# Patient Record
Sex: MALE | Race: OTHER | NOT HISPANIC OR LATINO | Employment: UNEMPLOYED | URBAN - METROPOLITAN AREA
[De-identification: names, ages, dates, MRNs, and addresses within clinical notes are randomized per-mention and may not be internally consistent; named-entity substitution may affect disease eponyms.]

---

## 2023-08-25 ENCOUNTER — APPOINTMENT (OUTPATIENT)
Dept: LAB | Facility: CLINIC | Age: 33
End: 2023-08-25
Payer: COMMERCIAL

## 2023-08-25 ENCOUNTER — OFFICE VISIT (OUTPATIENT)
Dept: FAMILY MEDICINE CLINIC | Facility: CLINIC | Age: 33
End: 2023-08-25
Payer: COMMERCIAL

## 2023-08-25 VITALS
TEMPERATURE: 97 F | OXYGEN SATURATION: 97 % | DIASTOLIC BLOOD PRESSURE: 86 MMHG | HEIGHT: 70 IN | HEART RATE: 71 BPM | SYSTOLIC BLOOD PRESSURE: 130 MMHG | WEIGHT: 213 LBS | BODY MASS INDEX: 30.49 KG/M2

## 2023-08-25 DIAGNOSIS — N46.9 INFERTILITY MALE: ICD-10-CM

## 2023-08-25 DIAGNOSIS — Z13.220 SCREENING FOR LIPID DISORDERS: ICD-10-CM

## 2023-08-25 DIAGNOSIS — Z00.01 ENCOUNTER FOR WELL ADULT EXAM WITH ABNORMAL FINDINGS: Primary | ICD-10-CM

## 2023-08-25 DIAGNOSIS — Z83.3 FAMILY HISTORY OF DIABETES MELLITUS (DM): ICD-10-CM

## 2023-08-25 DIAGNOSIS — E66.09 CLASS 1 OBESITY DUE TO EXCESS CALORIES WITHOUT SERIOUS COMORBIDITY WITH BODY MASS INDEX (BMI) OF 30.0 TO 30.9 IN ADULT: ICD-10-CM

## 2023-08-25 DIAGNOSIS — R53.83 OTHER FATIGUE: ICD-10-CM

## 2023-08-25 DIAGNOSIS — R09.81 NASAL CONGESTION: ICD-10-CM

## 2023-08-25 DIAGNOSIS — Z13.29 SCREENING FOR THYROID DISORDER: ICD-10-CM

## 2023-08-25 DIAGNOSIS — Z00.01 ENCOUNTER FOR WELL ADULT EXAM WITH ABNORMAL FINDINGS: ICD-10-CM

## 2023-08-25 PROBLEM — E66.811 CLASS 1 OBESITY DUE TO EXCESS CALORIES WITHOUT SERIOUS COMORBIDITY WITH BODY MASS INDEX (BMI) OF 30.0 TO 30.9 IN ADULT: Status: ACTIVE | Noted: 2023-08-25

## 2023-08-25 LAB
25(OH)D3 SERPL-MCNC: 21 NG/ML (ref 30–100)
ALBUMIN SERPL BCP-MCNC: 4.4 G/DL (ref 3.5–5)
ALP SERPL-CCNC: 69 U/L (ref 34–104)
ALT SERPL W P-5'-P-CCNC: 20 U/L (ref 7–52)
ANION GAP SERPL CALCULATED.3IONS-SCNC: 6 MMOL/L
AST SERPL W P-5'-P-CCNC: 17 U/L (ref 13–39)
BASOPHILS # BLD AUTO: 0.04 THOUSANDS/ÂΜL (ref 0–0.1)
BASOPHILS NFR BLD AUTO: 1 % (ref 0–1)
BILIRUB SERPL-MCNC: 0.36 MG/DL (ref 0.2–1)
BUN SERPL-MCNC: 21 MG/DL (ref 5–25)
CALCIUM SERPL-MCNC: 9.3 MG/DL (ref 8.4–10.2)
CHLORIDE SERPL-SCNC: 105 MMOL/L (ref 96–108)
CHOLEST SERPL-MCNC: 182 MG/DL
CO2 SERPL-SCNC: 29 MMOL/L (ref 21–32)
CREAT SERPL-MCNC: 0.92 MG/DL (ref 0.6–1.3)
EOSINOPHIL # BLD AUTO: 0.28 THOUSAND/ÂΜL (ref 0–0.61)
EOSINOPHIL NFR BLD AUTO: 7 % (ref 0–6)
ERYTHROCYTE [DISTWIDTH] IN BLOOD BY AUTOMATED COUNT: 13 % (ref 11.6–15.1)
GFR SERPL CREATININE-BSD FRML MDRD: 110 ML/MIN/1.73SQ M
GLUCOSE P FAST SERPL-MCNC: 89 MG/DL (ref 65–99)
HBV SURFACE AG SER QL: NORMAL
HCT VFR BLD AUTO: 48.6 % (ref 36.5–49.3)
HDLC SERPL-MCNC: 52 MG/DL
HGB BLD-MCNC: 16.4 G/DL (ref 12–17)
HIV 1+2 AB+HIV1 P24 AG SERPL QL IA: NORMAL
HIV 2 AB SERPL QL IA: NORMAL
HIV1 AB SERPL QL IA: NORMAL
HIV1 P24 AG SERPL QL IA: NORMAL
IMM GRANULOCYTES # BLD AUTO: 0.01 THOUSAND/UL (ref 0–0.2)
IMM GRANULOCYTES NFR BLD AUTO: 0 % (ref 0–2)
LDLC SERPL CALC-MCNC: 119 MG/DL (ref 0–100)
LYMPHOCYTES # BLD AUTO: 2.15 THOUSANDS/ÂΜL (ref 0.6–4.47)
LYMPHOCYTES NFR BLD AUTO: 50 % (ref 14–44)
MCH RBC QN AUTO: 30 PG (ref 26.8–34.3)
MCHC RBC AUTO-ENTMCNC: 33.7 G/DL (ref 31.4–37.4)
MCV RBC AUTO: 89 FL (ref 82–98)
MONOCYTES # BLD AUTO: 0.44 THOUSAND/ÂΜL (ref 0.17–1.22)
MONOCYTES NFR BLD AUTO: 10 % (ref 4–12)
NEUTROPHILS # BLD AUTO: 1.36 THOUSANDS/ÂΜL (ref 1.85–7.62)
NEUTS SEG NFR BLD AUTO: 32 % (ref 43–75)
NRBC BLD AUTO-RTO: 0 /100 WBCS
PLATELET # BLD AUTO: 201 THOUSANDS/UL (ref 149–390)
PMV BLD AUTO: 11.8 FL (ref 8.9–12.7)
POTASSIUM SERPL-SCNC: 4.2 MMOL/L (ref 3.5–5.3)
PROT SERPL-MCNC: 7.4 G/DL (ref 6.4–8.4)
RBC # BLD AUTO: 5.46 MILLION/UL (ref 3.88–5.62)
SODIUM SERPL-SCNC: 140 MMOL/L (ref 135–147)
TRIGL SERPL-MCNC: 56 MG/DL
TSH SERPL DL<=0.05 MIU/L-ACNC: 2.28 UIU/ML (ref 0.45–4.5)
VIT B12 SERPL-MCNC: 480 PG/ML (ref 180–914)
WBC # BLD AUTO: 4.28 THOUSAND/UL (ref 4.31–10.16)

## 2023-08-25 PROCEDURE — 87389 HIV-1 AG W/HIV-1&-2 AB AG IA: CPT

## 2023-08-25 PROCEDURE — 99385 PREV VISIT NEW AGE 18-39: CPT | Performed by: FAMILY MEDICINE

## 2023-08-25 PROCEDURE — 82607 VITAMIN B-12: CPT

## 2023-08-25 PROCEDURE — 85025 COMPLETE CBC W/AUTO DIFF WBC: CPT

## 2023-08-25 PROCEDURE — 80053 COMPREHEN METABOLIC PANEL: CPT

## 2023-08-25 PROCEDURE — 87340 HEPATITIS B SURFACE AG IA: CPT

## 2023-08-25 PROCEDURE — 82306 VITAMIN D 25 HYDROXY: CPT

## 2023-08-25 PROCEDURE — 80061 LIPID PANEL: CPT

## 2023-08-25 PROCEDURE — 99214 OFFICE O/P EST MOD 30 MIN: CPT | Performed by: FAMILY MEDICINE

## 2023-08-25 PROCEDURE — 36415 COLL VENOUS BLD VENIPUNCTURE: CPT

## 2023-08-25 PROCEDURE — 84443 ASSAY THYROID STIM HORMONE: CPT

## 2023-08-25 RX ORDER — ZINC GLUCONATE 50 MG
50 TABLET ORAL DAILY
COMMUNITY

## 2023-08-25 RX ORDER — FLUTICASONE PROPIONATE 50 MCG
1 SPRAY, SUSPENSION (ML) NASAL DAILY
Qty: 16 G | Refills: 1 | Status: SHIPPED | OUTPATIENT
Start: 2023-08-25

## 2023-08-25 NOTE — PROGRESS NOTES
ADULT ANNUAL PHYSICAL  [address] [facility] PRIMARY CARE [facility]    NAME: Josiane Brice  AGE: 34 y.o. SEX: male  : 1990     DATE: 2023     Assessment and Plan:     Problem List Items Addressed This Visit        Genitourinary    Infertility male    Relevant Orders    CBC and differential (Completed)    Comprehensive metabolic panel (Completed)    HIV 1/2 AG/AB w Reflex [facility] for 2 yr old and above (Completed)    Hepatitis B surface antigen (Completed)       Other    Encounter for well adult exam with abnormal findings - Primary     Advice and education were given regarding nutrition, aerobic exercises, weight-bearing exercises, cardiovascular risk reduction, fall risk reduction, and age-appropriate supplements. The patient was counseled regarding instructions for management, risk factor reductions, prognosis, risks and benefits of treatment options, patient and family education, and importance of compliance with treatment.           Relevant Orders    CBC and differential (Completed)    Comprehensive metabolic panel (Completed)    Other fatigue     New diagnosis symptomatic no rash and no large-volume no muscle atrophy or pain recommend work-up and discussed with the patient he agree discussed sleep hygiene and keep well hydration          Relevant Orders    CBC and differential (Completed)    Comprehensive metabolic panel (Completed)    Vitamin D 25 hydroxy (Completed)    Vitamin B12 (Completed)    Class 1 obesity due to excess calories without serious comorbidity with body mass index (BMI) of 30.0 to 30.9 in adult     BMI today 30.74 discussed with patient portion control low-carb low-fat diet and increase physical activity         Relevant Orders    CBC and differential (Completed)    Comprehensive metabolic panel (Completed)    Family history of diabetes mellitus (DM)     Abnormal BMI with a history of family history positive for diabetes visit commend to screen for DM   he agrees         Relevant Orders    CBC and differential (Completed)    Comprehensive metabolic panel (Completed)    Nasal congestion     New diagnosis symptomatic recommend as needed nasal spray 2 sprays nostril once a day proper use and possible side effect discussed with patient         Relevant Medications    fluticasone (FLONASE) 50 mcg/act nasal spray    Other Relevant Orders    CBC and differential (Completed)    Comprehensive metabolic panel (Completed)   Other Visit Diagnoses     Screening for thyroid disorder        Relevant Orders    TSH, 3rd generation with Free T4 reflex (Completed)    Screening for lipid disorders        Relevant Orders    Lipid Panel with Direct LDL reflex (Completed)          Immunizations and preventive care screenings were discussed with patient today. Appropriate education was printed on patient's after visit summary. Counseling:  Alcohol/drug use: discussed moderation in alcohol intake, the recommendations for healthy alcohol use, and avoidance of illicit drug use. Dental Health: discussed importance of regular tooth brushing, flossing, and dental visits. Injury prevention: discussed safety/seat belts, safety helmets, smoke detectors, carbon dioxide detectors, and smoking near bedding or upholstery. Sexual health: discussed sexually transmitted diseases, partner selection, use of condoms, avoidance of unintended pregnancy, and contraceptive alternatives. Exercise: the importance of regular exercise/physical activity was discussed. Recommend exercise 3-5 times per week for at least 30 minutes. BMI Counseling: Body mass index is 30.74 kg/m². The BMI is above normal. Nutrition recommendations include decreasing portion sizes, decreasing fast food intake and limiting drinks that contain sugar. Exercise recommendations include exercising 3-5 times per week. No pharmacotherapy was ordered.  Rationale for BMI follow-up plan is due to patient being overweight or obese. Depression Screening and Follow-up Plan: Patient was screened for depression during today's encounter. They screened negative with a PHQ-2 score of 0. Return in about 1 year (around 8/25/2024). Chief Complaint:     Chief Complaint   Patient presents with   • New Patient Visit   • Physical Exam      History of Present Illness:     Adult Annual Physical   Patient here for a comprehensive physical exam. The patient reports problems - concern about nasal congestion facial pressure postnasal drip no fever no cough no wheezing no headache also patient concerned about fatigue/NO rash no muscle pain no nausea no vomiting no diarrhea he has not been drinking with Benadryl. 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 Diet and Physical Activity  Diet/Nutrition: no special diet. Exercise: no formal exercise. Depression Screening  PHQ-2/9 Depression Screening    Little interest or pleasure in doing things: 0 - not at all  Feeling down, depressed, or hopeless: 0 - not at all  PHQ-2 Score: 0  PHQ-2 Interpretation: Negative depression screen       General Health  Sleep: gets 4-6 hours of sleep on average. Hearing: normal - bilateral.  Vision: previous LASIK surgery. Dental: regular dental visits.  Health  History of STDs?: no.     Review of Systems:     Review of Systems   Constitutional: Positive for fatigue. Negative for chills and fever. HENT: Positive for congestion. Negative for ear pain and sore throat. Eyes: Negative for pain and visual disturbance. Respiratory: Negative for cough and shortness of breath. Cardiovascular: Negative for chest pain and palpitations. Gastrointestinal: Negative for abdominal pain, blood in stool, constipation, diarrhea and vomiting. Genitourinary: Negative for dysuria and hematuria. Musculoskeletal: Negative for arthralgias and back pain. Skin: Negative for color change and rash.    Neurological: Negative for seizures and syncope. Hematological: Does not bruise/bleed easily. Psychiatric/Behavioral: Negative for behavioral problems. All other systems reviewed and are negative. Past Medical History:     History reviewed. No pertinent past medical history. Past Surgical History:     Past Surgical History:   Procedure Laterality Date   • LASIK        Social History:     Social History     Socioeconomic History   • Marital status: /Civil Union     Spouse name: None   • Number of children: None   • Years of education: None   • Highest education level: None   Occupational History   • None   Tobacco Use   • Smoking status: Never     Passive exposure: Never   • Smokeless tobacco: Never   Substance and Sexual Activity   • Alcohol use: Never   • Drug use: Never   • Sexual activity: None   Other Topics Concern   • None   Social History Narrative   • None     Social Determinants of Health     Financial Resource Strain: Not on file   Food Insecurity: Not on file   Transportation Needs: Not on file   Physical Activity: Not on file   Stress: Not on file   Social Connections: Not on file   Intimate Partner Violence: Not on file   Housing Stability: Not on file      Family History:     Family History   Problem Relation Age of Onset   • Hypertension Mother    • Hyperlipidemia Mother    • Diabetes Mother       Current Medications:     Current Outpatient Medications   Medication Sig Dispense Refill   • fluticasone (FLONASE) 50 mcg/act nasal spray 1 spray into each nostril daily 16 g 1   • zinc gluconate 50 mg tablet Take 50 mg by mouth daily       No current facility-administered medications for this visit. Allergies:     No Known Allergies   Physical Exam:     /86 (BP Location: Left arm, Patient Position: Sitting)   Pulse 71   Temp (!) 97 °F (36.1 °C) (Tympanic)   Ht 5' 9.8" (1.773 m)   Wt 96.6 kg (213 lb)   SpO2 97%   BMI 30.74 kg/m²     Physical Exam  Vitals and nursing note reviewed. Constitutional:       General: He is not in acute distress. Appearance: He is well-developed. HENT:      Head: Normocephalic and atraumatic. Right Ear: Tympanic membrane normal. There is no impacted cerumen. Left Ear: Tympanic membrane normal. There is no impacted cerumen. Nose: Congestion present. Mouth/Throat:      Pharynx: No posterior oropharyngeal erythema. Eyes:      General:         Right eye: No discharge. Left eye: No discharge. Conjunctiva/sclera: Conjunctivae normal.   Cardiovascular:      Rate and Rhythm: Normal rate and regular rhythm. Heart sounds: No murmur heard. Pulmonary:      Effort: Pulmonary effort is normal. No respiratory distress. Breath sounds: Normal breath sounds. Abdominal:      Palpations: Abdomen is soft. Tenderness: There is no abdominal tenderness. Musculoskeletal:         General: No swelling. Cervical back: Neck supple. Skin:     General: Skin is warm and dry. Capillary Refill: Capillary refill takes less than 2 seconds. Findings: No erythema or rash. Neurological:      Mental Status: He is alert and oriented to person, place, and time.    Psychiatric:         Mood and Affect: Mood normal.          Jacques Gallegos MD   [address] details…

## 2023-08-27 NOTE — ASSESSMENT & PLAN NOTE
BMI today 30.74 discussed with patient portion control low-carb low-fat diet and increase physical activity

## 2023-08-27 NOTE — ASSESSMENT & PLAN NOTE
New diagnosis symptomatic no rash and no large-volume no muscle atrophy or pain recommend work-up and discussed with the patient he agree discussed sleep hygiene and keep well hydration

## 2023-08-27 NOTE — ASSESSMENT & PLAN NOTE
New diagnosis symptomatic recommend as needed nasal spray 2 sprays nostril once a day proper use and possible side effect discussed with patient

## 2023-08-27 NOTE — ASSESSMENT & PLAN NOTE
Abnormal BMI with a history of family history positive for diabetes visit commend to screen for DM   he agrees

## 2023-09-26 ENCOUNTER — OFFICE VISIT (OUTPATIENT)
Dept: FAMILY MEDICINE CLINIC | Facility: CLINIC | Age: 33
End: 2023-09-26
Payer: COMMERCIAL

## 2023-09-26 VITALS
HEIGHT: 70 IN | SYSTOLIC BLOOD PRESSURE: 130 MMHG | OXYGEN SATURATION: 96 % | BODY MASS INDEX: 29.35 KG/M2 | DIASTOLIC BLOOD PRESSURE: 72 MMHG | TEMPERATURE: 97.6 F | HEART RATE: 73 BPM | WEIGHT: 205 LBS

## 2023-09-26 DIAGNOSIS — D72.818 OTHER DECREASED WHITE BLOOD CELL (WBC) COUNT: ICD-10-CM

## 2023-09-26 DIAGNOSIS — E55.9 VITAMIN D DEFICIENCY: Primary | ICD-10-CM

## 2023-09-26 PROBLEM — D72.819 LEUCOPENIA: Status: ACTIVE | Noted: 2023-09-26

## 2023-09-26 PROBLEM — E56.9 VITAMIN DEFICIENCY: Status: ACTIVE | Noted: 2023-09-26

## 2023-09-26 PROCEDURE — 99214 OFFICE O/P EST MOD 30 MIN: CPT | Performed by: FAMILY MEDICINE

## 2023-09-26 RX ORDER — ERGOCALCIFEROL 1.25 MG/1
50000 CAPSULE ORAL WEEKLY
Qty: 4 CAPSULE | Refills: 2 | Status: SHIPPED | OUTPATIENT
Start: 2023-09-26

## 2023-09-26 NOTE — ASSESSMENT & PLAN NOTE
New diagnosis finding on recent blood work recommended patient to start taking vitamin D 50,000 IU once a week for 12-week proper use and possible side effects constipation

## 2023-09-26 NOTE — ASSESSMENT & PLAN NOTE
New finding on recent blood work low white blood cell patient asymptomatic no fever no weight loss recommend to be a check in 4-week if persistent to be low we will refer the patient to see hematology

## 2023-09-26 NOTE — PROGRESS NOTES
Name: Nik Sellers      : 1990      MRN: [id number]  Encounter Provider: Pina Ferrara MD  Encounter Date: 2023   Encounter department: 1 [facility] [address]     1. Vitamin D deficiency  Assessment & Plan:  New diagnosis finding on recent blood work recommended patient to start taking vitamin D 50,000 IU once a week for 12-week proper use and possible side effects constipation    Orders:  -     ergocalciferol (VITAMIN D2) 50,000 units; Take 1 capsule (50,000 Units total) by mouth once a week  -     Vitamin D 25 hydroxy; Future    2. Other decreased white blood cell (WBC) count  Assessment & Plan:  New finding on recent blood work low white blood cell patient asymptomatic no fever no weight loss recommend to be a check in 4-week if persistent to be low we will refer the patient to see hematology    Orders:  -     CBC and differential; Future           Subjective      Patient here follow-up with a chronic condition recent blood work discussed with the patient no new concern    Review of Systems   Constitutional: Negative for chills and fever. HENT: Negative for ear pain and sore throat. Eyes: Negative for pain and visual disturbance. Respiratory: Negative for cough and shortness of breath. Cardiovascular: Negative for chest pain and palpitations. Gastrointestinal: Negative for abdominal pain, blood in stool, constipation, diarrhea and vomiting. Genitourinary: Negative for dysuria and hematuria. Musculoskeletal: Negative for arthralgias and back pain. Skin: Negative for color change and rash. Neurological: Negative for seizures and syncope. All other systems reviewed and are negative.       Current Outpatient Medications on File Prior to Visit   Medication Sig   • fluticasone (FLONASE) 50 mcg/act nasal spray 1 spray into each nostril daily   • zinc gluconate 50 mg tablet Take 50 mg by mouth daily       Objective     /72 (BP Location: Left arm, Patient Position: Sitting)   Pulse 73   Temp 97.6 °F (36.4 °C) (Tympanic)   Ht 5' 9.8" (1.773 m)   Wt 93 kg (205 lb)   SpO2 96%   BMI 29.58 kg/m²     Physical Exam  Vitals and nursing note reviewed. Constitutional:       General: He is not in acute distress. Appearance: He is well-developed. He is not diaphoretic. HENT:      Head: Normocephalic. Right Ear: External ear normal.      Left Ear: External ear normal.      Mouth/Throat:      Pharynx: No posterior oropharyngeal erythema. Eyes:      General:         Right eye: No discharge. Left eye: No discharge. Conjunctiva/sclera: Conjunctivae normal.   Neck:      Vascular: No JVD. Cardiovascular:      Rate and Rhythm: Normal rate and regular rhythm. Heart sounds: Normal heart sounds. No murmur heard. No gallop. Pulmonary:      Effort: Pulmonary effort is normal. No respiratory distress. Breath sounds: Normal breath sounds. No stridor. No wheezing or rales. Chest:      Chest wall: No tenderness. Abdominal:      General: There is no distension. Palpations: Abdomen is soft. There is no mass. Tenderness: There is no abdominal tenderness. There is no rebound. Musculoskeletal:         General: No tenderness. Cervical back: Normal range of motion and neck supple. Lymphadenopathy:      Cervical: No cervical adenopathy. Skin:     General: Skin is warm. Findings: No erythema or rash. Neurological:      Mental Status: He is alert and oriented to person, place, and time.        Grisel Pablo MD

## 2023-11-22 ENCOUNTER — OFFICE VISIT (OUTPATIENT)
Dept: FAMILY MEDICINE CLINIC | Facility: CLINIC | Age: 33
End: 2023-11-22
Payer: COMMERCIAL

## 2023-11-22 VITALS
DIASTOLIC BLOOD PRESSURE: 80 MMHG | OXYGEN SATURATION: 98 % | HEIGHT: 70 IN | HEART RATE: 67 BPM | BODY MASS INDEX: 27.49 KG/M2 | TEMPERATURE: 97 F | SYSTOLIC BLOOD PRESSURE: 110 MMHG | WEIGHT: 192 LBS

## 2023-11-22 DIAGNOSIS — E55.9 VITAMIN D DEFICIENCY: ICD-10-CM

## 2023-11-22 DIAGNOSIS — M54.41 ACUTE RIGHT-SIDED LOW BACK PAIN WITH RIGHT-SIDED SCIATICA: Primary | ICD-10-CM

## 2023-11-22 PROCEDURE — 99214 OFFICE O/P EST MOD 30 MIN: CPT | Performed by: FAMILY MEDICINE

## 2023-11-22 RX ORDER — METHYLPREDNISOLONE 4 MG/1
TABLET ORAL
Qty: 21 EACH | Refills: 0 | Status: SHIPPED | OUTPATIENT
Start: 2023-11-22

## 2023-11-22 RX ORDER — ERGOCALCIFEROL 1.25 MG/1
50000 CAPSULE ORAL WEEKLY
Qty: 4 CAPSULE | Refills: 2 | Status: SHIPPED | OUTPATIENT
Start: 2023-11-22

## 2023-11-22 NOTE — PROGRESS NOTES
Name: Omar Jaimes      : 1990      MRN: [id number]  Encounter Provider: Marthena Denver, MD  Encounter Date: 2023   Encounter department: 1 [facility] [address]     1. Acute right-sided low back pain with right-sided sciatica  Assessment & Plan:  Acute symptomatic no fall or trauma recommend idiopathically dissected proper use and possible side effects reviewed if no improvement call x-ray referral to physical therapy and further x-ray    Orders:  -     methylPREDNISolone 4 MG tablet therapy pack; Use as directed on package    2. Vitamin D deficiency  Assessment & Plan:  Chronic recommend the patient to take the vitamin D supplement for 12-week refill has been given    Orders:  -     ergocalciferol (VITAMIN D2) 50,000 units; Take 1 capsule (50,000 Units total) by mouth once a week        Depression Screening and Follow-up Plan: Patient was screened for depression during today's encounter. They screened negative with a PHQ-2 score of 0. Subjective      Patient concerned about low back pain radiating to the back. Worse when he sits for a long time and no fall no trauma no numbness no muscle weakness numbness control of the urine or stool patient history of vitamin D deficiency patient thought he had the vitamin D supplement 1000 units just for 4-week      Review of Systems   Constitutional:  Negative for chills and fever. HENT:  Negative for ear pain and sore throat. Eyes:  Negative for pain and visual disturbance. Respiratory:  Negative for cough and shortness of breath. Cardiovascular:  Negative for chest pain and palpitations. Gastrointestinal:  Negative for abdominal pain, constipation, diarrhea and vomiting. Genitourinary:  Negative for dysuria and hematuria. Musculoskeletal:  Positive for back pain. Skin:  Negative for color change and rash. Neurological:  Negative for seizures and syncope.    All other systems reviewed and are negative. Current Outpatient Medications on File Prior to Visit   Medication Sig   • zinc gluconate 50 mg tablet Take 50 mg by mouth daily   • [DISCONTINUED] ergocalciferol (VITAMIN D2) 50,000 units Take 1 capsule (50,000 Units total) by mouth once a week   • [DISCONTINUED] fluticasone (FLONASE) 50 mcg/act nasal spray 1 spray into each nostril daily       Objective     /80 (BP Location: Left arm, Patient Position: Sitting)   Pulse 67   Temp (!) 97 °F (36.1 °C) (Tympanic)   Ht 5' 9.5" (1.765 m)   Wt 87.1 kg (192 lb)   SpO2 98%   BMI 27.95 kg/m²     Physical Exam  Vitals and nursing note reviewed. Constitutional:       General: He is not in acute distress. Appearance: Normal appearance. He is well-developed. He is not diaphoretic. HENT:      Head: Normocephalic. Right Ear: Tympanic membrane, ear canal and external ear normal.      Left Ear: Tympanic membrane, ear canal and external ear normal.      Nose: Nose normal. No congestion or rhinorrhea. Mouth/Throat:      Mouth: Mucous membranes are moist.      Pharynx: Oropharynx is clear. No oropharyngeal exudate or posterior oropharyngeal erythema. Eyes:      General:         Right eye: No discharge. Left eye: No discharge. Conjunctiva/sclera: Conjunctivae normal.   Neck:      Vascular: No JVD. Cardiovascular:      Rate and Rhythm: Normal rate and regular rhythm. Heart sounds: Normal heart sounds. No murmur heard. No gallop. Pulmonary:      Effort: Pulmonary effort is normal. No respiratory distress. Breath sounds: Normal breath sounds. No stridor. No wheezing or rales. Chest:      Chest wall: No tenderness. Abdominal:      General: There is no distension. Palpations: Abdomen is soft. There is no mass. Tenderness: There is no abdominal tenderness. There is no rebound. Musculoskeletal:      Cervical back: Normal range of motion and neck supple. Lumbar back: Tenderness present. Positive right straight leg raise test. Negative left straight leg raise test.        Back:    Lymphadenopathy:      Cervical: No cervical adenopathy. Skin:     General: Skin is warm. Findings: No erythema or rash. Neurological:      Mental Status: He is alert and oriented to person, place, and time. Sensory: No sensory deficit.       Gait: Gait normal.   Psychiatric:         Mood and Affect: Mood normal.         Behavior: Behavior normal.       Emily Anne MD Never smoker

## 2023-11-22 NOTE — ASSESSMENT & PLAN NOTE
Acute symptomatic no fall or trauma recommend idiopathically dissected proper use and possible side effects reviewed if no improvement call x-ray referral to physical therapy and further x-ray

## 2024-02-28 ENCOUNTER — OFFICE VISIT (OUTPATIENT)
Dept: FAMILY MEDICINE CLINIC | Facility: CLINIC | Age: 34
End: 2024-02-28
Payer: COMMERCIAL

## 2024-02-28 VITALS
DIASTOLIC BLOOD PRESSURE: 82 MMHG | SYSTOLIC BLOOD PRESSURE: 118 MMHG | HEART RATE: 73 BPM | OXYGEN SATURATION: 96 % | HEIGHT: 70 IN | TEMPERATURE: 98.1 F | BODY MASS INDEX: 29.12 KG/M2 | WEIGHT: 203.4 LBS

## 2024-02-28 DIAGNOSIS — G89.29 ACUTE EXACERBATION OF CHRONIC LOW BACK PAIN: Primary | ICD-10-CM

## 2024-02-28 DIAGNOSIS — M54.50 ACUTE EXACERBATION OF CHRONIC LOW BACK PAIN: Primary | ICD-10-CM

## 2024-02-28 DIAGNOSIS — M54.41 ACUTE RIGHT-SIDED LOW BACK PAIN WITH RIGHT-SIDED SCIATICA: ICD-10-CM

## 2024-02-28 PROCEDURE — 99214 OFFICE O/P EST MOD 30 MIN: CPT | Performed by: FAMILY MEDICINE

## 2024-02-28 RX ORDER — METHYLPREDNISOLONE 4 MG/1
TABLET ORAL
Qty: 21 EACH | Refills: 0 | Status: SHIPPED | OUTPATIENT
Start: 2024-02-28

## 2024-03-01 NOTE — ASSESSMENT & PLAN NOTE
Acute on chronic low back pain no recent trauma recommend x-ray of the lumbar spine Medrol pack proper use reviewed possible side effect

## 2024-03-01 NOTE — PROGRESS NOTES
Name: Yasmine Wahl      : 1990      MRN: [id number]  Encounter Provider: Romy Singh MD  Encounter Date: 2024   Encounter department: [facility]    Assessment & Plan     1. Acute exacerbation of chronic low back pain  Assessment & Plan:  Acute on chronic low back pain no recent trauma recommend x-ray of the lumbar spine Medrol pack proper use reviewed possible side effect    Orders:  -     XR spine lumbar minimum 4 views non injury; Future; Expected date: 2024    2. Acute right-sided low back pain with right-sided sciatica  -     methylPREDNISolone 4 MG tablet therapy pack; Use as directed on package  -     XR spine lumbar minimum 4 views non injury; Future; Expected date: 2024           Subjective      Patient now known to have history chronic low back pain called today concerned about pain acting up on him No trauma localized in the lower back radiated to the lower extremity no numbness or muscle weakness no lose control of the urine or stool and no recent fall or trauma      Review of Systems   Constitutional:  Negative for chills and fever.   HENT:  Negative for ear pain and sore throat.    Eyes:  Negative for pain and visual disturbance.   Respiratory:  Negative for cough and shortness of breath.    Cardiovascular:  Negative for chest pain and palpitations.   Gastrointestinal:  Negative for abdominal pain, constipation, diarrhea and vomiting.   Genitourinary:  Negative for dysuria and hematuria.   Musculoskeletal:  Positive for back pain. Negative for arthralgias.   Skin:  Negative for color change and rash.   Neurological:  Negative for seizures and syncope.   All other systems reviewed and are negative.      Current Outpatient Medications on File Prior to Visit   Medication Sig   • ergocalciferol (VITAMIN D2) 50,000 units Take 1 capsule (50,000 Units total) by mouth once a week (Patient not taking: Reported on 2024)   • zinc gluconate 50  "mg tablet Take 50 mg by mouth daily (Patient not taking: Reported on 2/28/2024)       Objective     /82 (BP Location: Left arm, Patient Position: Sitting)   Pulse 73   Temp 98.1 °F (36.7 °C) (Tympanic)   Ht 5' 9.5\" (1.765 m)   Wt 92.3 kg (203 lb 6.4 oz)   SpO2 96%   BMI 29.61 kg/m²     Physical Exam  Vitals and nursing note reviewed.   Constitutional:       General: He is not in acute distress.     Appearance: He is well-developed. He is not diaphoretic.   HENT:      Head: Normocephalic.      Right Ear: Tympanic membrane and external ear normal.      Left Ear: Tympanic membrane and external ear normal.      Nose: No rhinorrhea.      Mouth/Throat:      Pharynx: No posterior oropharyngeal erythema.   Eyes:      General:         Right eye: No discharge.         Left eye: No discharge.      Conjunctiva/sclera: Conjunctivae normal.   Neck:      Vascular: No JVD.   Cardiovascular:      Rate and Rhythm: Normal rate and regular rhythm.      Heart sounds: Normal heart sounds. No murmur heard.     No gallop.   Pulmonary:      Effort: Pulmonary effort is normal. No respiratory distress.      Breath sounds: Normal breath sounds. No stridor. No wheezing or rales.   Chest:      Chest wall: No tenderness.   Abdominal:      General: There is no distension.      Palpations: Abdomen is soft. There is no mass.      Tenderness: There is no abdominal tenderness. There is no rebound.   Musculoskeletal:      Cervical back: Normal range of motion and neck supple.      Lumbar back: Tenderness present. No signs of trauma or bony tenderness. Positive right straight leg raise test and positive left straight leg raise test.   Lymphadenopathy:      Cervical: No cervical adenopathy.   Skin:     General: Skin is warm.      Findings: No erythema or rash.   Neurological:      Mental Status: He is alert and oriented to person, place, and time.       Romy Singh MD    "

## 2024-05-17 ENCOUNTER — HOSPITAL ENCOUNTER (OUTPATIENT)
Dept: RADIOLOGY | Facility: HOSPITAL | Age: 34
Discharge: HOME | End: 2024-05-17
Payer: COMMERCIAL

## 2024-05-17 DIAGNOSIS — M54.50 ACUTE EXACERBATION OF CHRONIC LOW BACK PAIN: ICD-10-CM

## 2024-05-17 DIAGNOSIS — M54.41 ACUTE RIGHT-SIDED LOW BACK PAIN WITH RIGHT-SIDED SCIATICA: ICD-10-CM

## 2024-05-17 DIAGNOSIS — G89.29 ACUTE EXACERBATION OF CHRONIC LOW BACK PAIN: ICD-10-CM

## 2024-05-17 PROCEDURE — 72110 X-RAY EXAM L-2 SPINE 4/>VWS: CPT

## 2024-05-20 ENCOUNTER — TELEMEDICINE (OUTPATIENT)
Dept: FAMILY MEDICINE CLINIC | Facility: CLINIC | Age: 34
End: 2024-05-20
Payer: COMMERCIAL

## 2024-05-20 VITALS — BODY MASS INDEX: 29.06 KG/M2 | WEIGHT: 203 LBS | HEIGHT: 70 IN

## 2024-05-20 DIAGNOSIS — Z13.0 SCREENING FOR BLOOD DISEASE: ICD-10-CM

## 2024-05-20 DIAGNOSIS — Z13.29 SCREENING FOR THYROID DISORDER: ICD-10-CM

## 2024-05-20 DIAGNOSIS — D72.818 OTHER DECREASED WHITE BLOOD CELL (WBC) COUNT: ICD-10-CM

## 2024-05-20 DIAGNOSIS — M54.50 ACUTE EXACERBATION OF CHRONIC LOW BACK PAIN: Primary | ICD-10-CM

## 2024-05-20 DIAGNOSIS — E66.09 CLASS 1 OBESITY DUE TO EXCESS CALORIES WITHOUT SERIOUS COMORBIDITY WITH BODY MASS INDEX (BMI) OF 30.0 TO 30.9 IN ADULT: ICD-10-CM

## 2024-05-20 DIAGNOSIS — E55.9 VITAMIN D DEFICIENCY: ICD-10-CM

## 2024-05-20 DIAGNOSIS — G89.29 ACUTE EXACERBATION OF CHRONIC LOW BACK PAIN: Primary | ICD-10-CM

## 2024-05-20 DIAGNOSIS — Z13.228 SCREENING FOR METABOLIC DISORDER: ICD-10-CM

## 2024-05-20 DIAGNOSIS — Z13.220 SCREENING FOR LIPID DISORDERS: ICD-10-CM

## 2024-05-20 PROCEDURE — 99214 OFFICE O/P EST MOD 30 MIN: CPT | Performed by: FAMILY MEDICINE

## 2024-05-20 RX ORDER — NAPROXEN 250 MG/1
250 TABLET ORAL 2 TIMES DAILY WITH MEALS
Qty: 60 TABLET | Refills: 0 | Status: SHIPPED | OUTPATIENT
Start: 2024-05-20

## 2024-05-20 NOTE — ASSESSMENT & PLAN NOTE
Chronic patient currently not on any vitamin D supplements recommend to check vitamin D levels will follow-up with the result accordingly

## 2024-05-20 NOTE — ASSESSMENT & PLAN NOTE
Acute symptomatic x-ray no abnormal finding recommend to start Naprosyn 250 mg twice a day proper use and possible side effect review recommend physical therapy and MRI of the lumbar spin  Also I recommend to do blood work check C-reactive protein and BREANNE HLA-B27

## 2024-05-20 NOTE — PROGRESS NOTES
Virtual Regular Visit    Verification of patient location:    Patient is located at Other in the following state in which I hold an active license [place]      Assessment/Plan:    Problem List Items Addressed This Visit       Class 1 obesity due to excess calories without serious comorbidity with body mass index (BMI) of 30.0 to 30.9 in adult    Relevant Orders    Comprehensive metabolic panel    Vitamin D deficiency     Chronic patient currently not on any vitamin D supplements recommend to check vitamin D levels will follow-up with the result accordingly         Relevant Orders    CBC and differential    Comprehensive metabolic panel    BREANNE w/Reflex    RF Screen w/ Reflex to Titer    Vitamin D 25 hydroxy    Cyclic citrul peptide antibody, IgG    HLA-B27 antigen    MRI lumbar spine wo contrast    Leucopenia    Relevant Medications    naproxen (NAPROSYN) 250 mg tablet    Other Relevant Orders    CBC and differential    Comprehensive metabolic panel    Acute exacerbation of chronic low back pain - Primary     Acute symptomatic x-ray no abnormal finding recommend to start Naprosyn 250 mg twice a day proper use and possible side effect review recommend physical therapy and MRI of the lumbar spin  Also I recommend to do blood work check C-reactive protein and BREANNE HLA-B27         Relevant Medications    naproxen (NAPROSYN) 250 mg tablet    Other Relevant Orders    CBC and differential    Comprehensive metabolic panel    BREANNE w/Reflex    RF Screen w/ Reflex to Titer    Vitamin D 25 hydroxy    Cyclic citrul peptide antibody, IgG    HLA-B27 antigen    MRI lumbar spine wo contrast    Ambulatory Referral to Physical Therapy     Other Visit Diagnoses       Screening for thyroid disorder        Relevant Orders    TSH, 3rd generation with Free T4 reflex    Screening for lipid disorders        Relevant Orders    Lipid Panel with Direct LDL reflex    Screening for metabolic disorder        Relevant Orders    Comprehensive metabolic  panel    Screening for blood disease        Relevant Orders    CBC and differential                 Reason for visit is   Chief Complaint   Patient presents with    Follow-up    Virtual Regular Visit          Encounter provider Romy Singh MD      Recent Visits  No visits were found meeting these conditions.  Showing recent visits within past 7 days and meeting all other requirements  Today's Visits  Date Type Provider Dept   05/20/24 Telemedicine Romy Singh MD [facility] [place]   Showing today's visits and meeting all other requirements  Future Appointments  No visits were found meeting these conditions.  Showing future appointments within next 150 days and meeting all other requirements       The patient was identified by name and date of birth. Yasmine Wahl was informed that this is a telemedicine visit and that the visit is being conducted through the Epic Embedded platform. He agrees to proceed..  My office door was closed. No one else was in the room.  He acknowledged consent and understanding of privacy and security of the video platform. The patient has agreed to participate and understands they can discontinue the visit at any time.    Patient is aware this is a billable service.     Subjective  Yasmine Wahl is a 33 y.o. male  .      Patient virtual visit follow-up with a chronic condition patient who known to have history of chronic back pain his back pain acting on pain localized in the lumbar spine radiate to the lower extremity he took a Medrol pack and no improvement at all no recent fall or trauma he had x-ray of the lumbar spine results reviewed no abnormal finding patient history of vitamin D deficiency finish the course of 12-week currently not on any vitamin supplement         History reviewed. No pertinent past medical history.    Past Surgical History:   Procedure Laterality Date    LASIK         Current Outpatient Medications   Medication Sig Dispense Refill    naproxen  "(NAPROSYN) 250 mg tablet Take 1 tablet (250 mg total) by mouth 2 (two) times a day with meals 60 tablet 0    ergocalciferol (VITAMIN D2) 50,000 units Take 1 capsule (50,000 Units total) by mouth once a week (Patient not taking: Reported on 2/28/2024) 4 capsule 2     No current facility-administered medications for this visit.        No Known Allergies    Review of Systems   Constitutional:  Negative for chills and fever.   HENT:  Negative for ear pain and sore throat.    Eyes:  Negative for pain and visual disturbance.   Respiratory:  Negative for cough and shortness of breath.    Cardiovascular:  Negative for chest pain and palpitations.   Gastrointestinal:  Negative for abdominal pain, constipation, diarrhea and vomiting.   Genitourinary:  Negative for dysuria and hematuria.   Musculoskeletal:  Positive for back pain. Negative for arthralgias.   Skin:  Negative for color change and rash.   Neurological:  Negative for seizures and syncope.   All other systems reviewed and are negative.      Video Exam    Vitals:    05/20/24 1256   Weight: 92.1 kg (203 lb)   Height: 5' 9.5\" (1.765 m)       Physical Exam  Constitutional:       Appearance: Normal appearance.   HENT:      Head: Normocephalic and atraumatic.      Nose: No congestion or rhinorrhea.      Mouth/Throat:      Pharynx: No oropharyngeal exudate or posterior oropharyngeal erythema.   Eyes:      General:         Right eye: No discharge.         Left eye: No discharge.   Pulmonary:      Effort: No respiratory distress.   Abdominal:      General: There is no distension.   Skin:     Findings: No rash.   Neurological:      Mental Status: He is oriented to person, place, and time.          Visit Time  Total Visit Duration: 15        "

## 2024-05-31 ENCOUNTER — HOSPITAL ENCOUNTER (OUTPATIENT)
Dept: MRI IMAGING | Facility: HOSPITAL | Age: 34
Discharge: HOME | End: 2024-05-31
Payer: COMMERCIAL

## 2024-05-31 DIAGNOSIS — M54.50 ACUTE EXACERBATION OF CHRONIC LOW BACK PAIN: ICD-10-CM

## 2024-05-31 DIAGNOSIS — E55.9 VITAMIN D DEFICIENCY: ICD-10-CM

## 2024-05-31 DIAGNOSIS — G89.29 ACUTE EXACERBATION OF CHRONIC LOW BACK PAIN: ICD-10-CM

## 2024-05-31 PROCEDURE — 72148 MRI LUMBAR SPINE W/O DYE: CPT

## 2024-06-16 DIAGNOSIS — G89.29 ACUTE EXACERBATION OF CHRONIC LOW BACK PAIN: ICD-10-CM

## 2024-06-16 DIAGNOSIS — M54.50 ACUTE EXACERBATION OF CHRONIC LOW BACK PAIN: ICD-10-CM

## 2024-06-16 RX ORDER — NAPROXEN 250 MG/1
TABLET ORAL
Qty: 60 TABLET | Refills: 5 | Status: SHIPPED | OUTPATIENT
Start: 2024-06-16 | End: 2024-06-28

## 2024-06-17 ENCOUNTER — TELEPHONE (OUTPATIENT)
Dept: FAMILY MEDICINE CLINIC | Facility: CLINIC | Age: 34
End: 2024-06-17

## 2024-06-17 DIAGNOSIS — M47.9 SPONDYLOSIS: Primary | ICD-10-CM

## 2024-06-17 DIAGNOSIS — M47.816 SPONDYLOSIS OF LUMBAR SPINE: ICD-10-CM

## 2024-06-17 NOTE — TELEPHONE ENCOUNTER
Called and spoke with patient regarding MRI results. Advised patient of DX and referral that was placed for patient. Provided patient with phone number to schedule appointment.     Asked patient if he would like to keep or cancel upcoming appointment scheduled for 6/28/24 to discuss MRI results. Patient advised that he would like to keep this appointment as there are things that he would like to discuss with provider.

## 2024-06-17 NOTE — TELEPHONE ENCOUNTER
Left message for patient to return call to the office regarding results.     Referral for pain management ordered for patient.

## 2024-06-17 NOTE — TELEPHONE ENCOUNTER
Spoke to patient sent my chart message to contact insurance company to find participating provider

## 2024-06-17 NOTE — TELEPHONE ENCOUNTER
Patient called, stating he wanted to schedule an appointment to go over his MRI results, which I did.  After ending call and opening chart to put a message in, I see the below encounter.  He did not mention that he received a message or was returning a call.  Please return call to patient.

## 2024-06-17 NOTE — TELEPHONE ENCOUNTER
----- Message from Romy Singh MD sent at 6/16/2024  6:36 PM EDT -----  Spondylosis of lumbar spine refer patient to see pain management

## 2024-06-17 NOTE — TELEPHONE ENCOUNTER
Pt called back stating that he called [facility] and he is unable to schedule with them because his insurance is not accepted. Does the doctor have any other recommendations?. Pt stated that she was able to check eligibility with his insurance once b4

## 2024-06-26 ENCOUNTER — CONSULT (OUTPATIENT)
Dept: PAIN MEDICINE | Facility: CLINIC | Age: 34
End: 2024-06-26
Payer: COMMERCIAL

## 2024-06-26 VITALS — BODY MASS INDEX: 29.06 KG/M2 | HEIGHT: 70 IN | WEIGHT: 203 LBS

## 2024-06-26 DIAGNOSIS — M47.816 SPONDYLOSIS OF LUMBAR SPINE: ICD-10-CM

## 2024-06-26 DIAGNOSIS — M54.16 LUMBAR RADICULITIS: Primary | ICD-10-CM

## 2024-06-26 PROCEDURE — 99204 OFFICE O/P NEW MOD 45 MIN: CPT | Performed by: ANESTHESIOLOGY

## 2024-06-26 RX ORDER — GABAPENTIN 300 MG/1
300 CAPSULE ORAL 2 TIMES DAILY
Qty: 60 CAPSULE | Refills: 0 | Status: SHIPPED | OUTPATIENT
Start: 2024-06-26

## 2024-06-26 NOTE — PROGRESS NOTES
Assessment  1. Lumbar radiculitis    2. Spondylosis of lumbar spine      Patient presenting with ongoing right sided low back and radiating buttock pain for 4+ months.  Pain is consistent with lumbar radicular pain accompanied by pain 7/10 on the pain scale with inability to participate in IADLs for >6 weeks. Patient has tried naproxen, oral steroids with limited benefit.    Denies any bowel or bladder incontinence, saddle anesthesia.    In regards to the patient's pathology, we discussed the various treatment options including physical therapy, chiropractic treatment, medication management, activity modifications, interventional spine procedures.      Independently reviewed and interpreted lumbar MRI - this showed no evidence of disc herniation. There is mild spondylosis with moderate left foraminal narrowing at L5-S1, while patient's symptoms are right sided.    Plan    Recommended to initiate conservative therapy (patient has tried medication therapies since initial onset but has not trialed PT yet).    Will start course of gabapentin 300mg BID as tolerated.  I advised the patient that it could cause lethargy and mental cloudiness. I advised the patient to call our office if they experience any side effects or issues with the medication changes. The patient verbalized an understanding.    Will follow up in 6 weeks, if no improvement will discuss interventional options.    Reviewed external notes from PCP in regards to recent and prior relevant medical histories, treatment recommendations, medication and/or interventional treatment responses.    Reviewed renal function, CBC prior to recommending, initiating, continuing and/or adjusting medications.    Reviewed hemoglobin A1c, renal function, CBC and/or PT/INR prior to discussing/offering interventional modalities.    [place] Prescription Drug Monitoring Program report was reviewed and was appropriate     My impressions and treatment recommendations were  discussed in detail with the patient who verbalized understanding and had no further questions.  Discharge instructions were provided. I personally saw and examined the patient and I agree with the above discussed plan of care.    Orders Placed This Encounter   Procedures    Ambulatory referral to Physical Therapy     Standing Status:   Future     Standing Expiration Date:   6/26/2025     Referral Priority:   Routine     Referral Type:   Physical Therapy     Referral Reason:   Specialty Services Required     Requested Specialty:   Physical Therapy     Number of Visits Requested:   1     Expiration Date:   6/26/2025     New Medications Ordered This Visit   Medications    gabapentin (NEURONTIN) 300 mg capsule     Sig: Take 1 capsule (300 mg total) by mouth 2 (two) times a day     Dispense:  60 capsule     Refill:  0       History of Present Illness    Yasmine Wahl is a 33 y.o. male presenting for consultation (referred by Dr. Singh) at [facility] for exam and evaluation of ongoing R lower back and radiating buttock pain for the past 4 months. Pain started without any precipitating injury or trauma. Over the past month, the intensity of pain has been Moderate to severe. Pain is currently 7/10. Pain does interfere with age appropriate activities of daily living. Pain is intermittent, with no typical pattern throughout the day. Pain is described as shooting, sharp. Patient denies weakness in the lower extremities. Assistance device used: None.    Pain is increased with standing, sitting.   Pain is decreased with lying down.    Treatments tried:   PT: no  Chiropractic therapy: no  Injections: no   Previous spine surgery: No    Anticoagulation: no    Medications tried:   naproxen    I have personally reviewed and/or updated the patient's past medical history, past surgical history, family history, social history, current medications, allergies, and vital signs today.     Review of Systems    Constitutional:  Negative for chills and fever.   HENT:  Negative for ear pain and sore throat.    Eyes:  Negative for pain and visual disturbance.   Respiratory:  Negative for cough and shortness of breath.    Cardiovascular:  Negative for chest pain and palpitations.   Gastrointestinal:  Negative for abdominal pain and vomiting.   Genitourinary:  Negative for dysuria and hematuria.   Musculoskeletal:  Positive for back pain, gait problem and myalgias. Negative for arthralgias.   Skin:  Negative for color change and rash.   Neurological:  Negative for seizures and syncope.   All other systems reviewed and are negative.      Patient Active Problem List   Diagnosis    Encounter for well adult exam with abnormal findings    Other fatigue    Class 1 obesity due to excess calories without serious comorbidity with body mass index (BMI) of 30.0 to 30.9 in adult    Family history of diabetes mellitus (DM)    Nasal congestion    Infertility male    Vitamin D deficiency    Leucopenia    Acute exacerbation of chronic low back pain       History reviewed. No pertinent past medical history.    Past Surgical History:   Procedure Laterality Date    LASIK         Family History   Problem Relation Age of Onset    Hypertension Mother     Hyperlipidemia Mother     Diabetes Mother        Social History     Occupational History    Not on file   Tobacco Use    Smoking status: Never     Passive exposure: Never    Smokeless tobacco: Never   Vaping Use    Vaping status: Never Used   Substance and Sexual Activity    Alcohol use: Never    Drug use: Never    Sexual activity: Not on file       Current Outpatient Medications on File Prior to Visit   Medication Sig    ergocalciferol (VITAMIN D2) 50,000 units Take 1 capsule (50,000 Units total) by mouth once a week (Patient not taking: Reported on 2/28/2024)    naproxen (NAPROSYN) 250 mg tablet TAKE 1 TABLET BY MOUTH 2 TIMES A DAY WITH MEALS. (Patient not taking: Reported on 6/26/2024)     No  "current facility-administered medications on file prior to visit.       No Known Allergies    Physical Exam    Ht 5' 9.5\" (1.765 m)   Wt 92.1 kg (203 lb)   BMI 29.55 kg/m²     Constitutional: normal, well developed, well nourished, alert, in no distress and non-toxic and no overt pain behavior.  Eyes: anicteric  HEENT: grossly intact  Neck: supple, symmetric, trachea midline and no masses   Pulmonary:even and unlabored  Cardiovascular:No edema or pitting edema present  Skin:Normal without rashes or lesions and well hydrated  Psychiatric:Mood and affect appropriate  Neurologic: Motor function is grossly intact with no focal neurologic deficits   Musculoskeletal: No significant tenderness in the lumbar paraspinals or overlying the SIJ    Imaging  MRI lumbar spine:  FINDINGS:     VERTEBRAL SEGMENT AND DISC SPACES:  There are 5 caudal ribless segmented lumbar type vertebra; the last of which designated L5 for the purposes of vertebral segment numbering on this examination.     T11-T12 through L2-L3: No significant abnormality evident.     L3-L4: Evidence of mild bilateral neural foraminal narrowing due to hypertrophic facet arthropathy. Mild disc degeneration with minimal disc bulge.     L4-L5: Evidence of mild bilateral neural foraminal narrowing due to disc bulge and mild hypertrophic facet arthropathy. Mild disc degeneration.     L5-S1: Evidence of neural foraminal narrowing of a moderate degree on the left and mild degree on the right due to hypertrophic facet arthropathy and disc bulge. Mild disc degeneration.     Included portions of the sacrum: No significant abnormality evident.     SPINAL CORD: No spinal cord abnormality evident.     EXTRASPINAL STRUCTURES: No significant abnormality evident.     MARROW SIGNAL: No significant abnormality evident.     IMPRESSION:  Mild spondylosis associated with some neural foraminal narrowing detailed above and most notable at L5-S1. There is no neural encroachment " evident.

## 2024-06-28 ENCOUNTER — OFFICE VISIT (OUTPATIENT)
Dept: FAMILY MEDICINE CLINIC | Facility: CLINIC | Age: 34
End: 2024-06-28
Payer: COMMERCIAL

## 2024-06-28 VITALS
HEIGHT: 70 IN | TEMPERATURE: 97.6 F | OXYGEN SATURATION: 97 % | SYSTOLIC BLOOD PRESSURE: 118 MMHG | DIASTOLIC BLOOD PRESSURE: 78 MMHG | BODY MASS INDEX: 29.63 KG/M2 | HEART RATE: 61 BPM | WEIGHT: 207 LBS

## 2024-06-28 DIAGNOSIS — Z13.29 SCREENING FOR THYROID DISORDER: ICD-10-CM

## 2024-06-28 DIAGNOSIS — L29.9 ITCHY SKIN: Primary | ICD-10-CM

## 2024-06-28 DIAGNOSIS — M47.816 SPONDYLOSIS OF LUMBAR SPINE: ICD-10-CM

## 2024-06-28 DIAGNOSIS — Z13.220 SCREENING FOR LIPID DISORDERS: ICD-10-CM

## 2024-06-28 DIAGNOSIS — E55.9 VITAMIN D DEFICIENCY: ICD-10-CM

## 2024-06-28 DIAGNOSIS — M54.16 LUMBAR RADICULITIS: ICD-10-CM

## 2024-06-28 PROCEDURE — 99214 OFFICE O/P EST MOD 30 MIN: CPT | Performed by: FAMILY MEDICINE

## 2024-06-28 NOTE — ASSESSMENT & PLAN NOTE
New diagnosis symptomatic recommend to do respiratory and food allergy keep well hydration we will follow-up with the result accordingly

## 2024-06-28 NOTE — ASSESSMENT & PLAN NOTE
New diagnosis symptomatic with the low back pain recent MRI of the lumbar spine discussed with the patient I already refer the patient to see pain management who recommend gabapentin 300 mg twice a day and physical therapy

## 2024-06-28 NOTE — ASSESSMENT & PLAN NOTE
Chronic asymptomatic patient finish 50,000 course that he did not take vitamin D 2000 yet recommend to start vitamin D 2000 once a day vitamin D rich diet discussed with the patient

## 2024-06-28 NOTE — PROGRESS NOTES
Ambulatory Visit  Name: Yasmine Wahl      : 1990      MRN: [id number]  Encounter Provider: Romy Singh MD  Encounter Date: 2024   Encounter department: [facility]    Assessment & Plan   1. Itchy skin  Assessment & Plan:  New diagnosis symptomatic recommend to do respiratory and food allergy keep well hydration we will follow-up with the result accordingly  Orders:  -     Food Allergy Profile; Future  -     [place] Allergy Panel, Adult; Future  2. Spondylosis of lumbar spine  Assessment & Plan:  New diagnosis finding on MRI of the lumbar spine recommend to the patient to continue gabapentin 300 twice a day and physical therapy patient already follow-up with the pain management  3. Lumbar radiculitis  Assessment & Plan:  New diagnosis symptomatic with the low back pain recent MRI of the lumbar spine discussed with the patient I already refer the patient to see pain management who recommend gabapentin 300 mg twice a day and physical therapy  4. Vitamin D deficiency  Assessment & Plan:  Chronic asymptomatic patient finish 50,000 course that he did not take vitamin D 2000 yet recommend to start vitamin D 2000 once a day vitamin D rich diet discussed with the patient  Orders:  -     CBC and differential; Future  -     Comprehensive metabolic panel; Future  -     Vitamin D 25 hydroxy; Future  5. Screening for thyroid disorder  -     TSH, 3rd generation with Free T4 reflex; Future  6. Screening for lipid disorders  -     Lipid panel; Future       History of Present Illness     Patient here follow-up with a chronic condition and concerned about itchy skin and sometimes he had it in the face around the months in the back of his throat that he get nasal congestion postnasal drip but no rash no wheezing no cough no blood in the urine patient history of vitamin D deficiency finish vitamin D 50,000 once a week it is he did not start the 2000 daily yet patient who had  "chronic back pain workup including MRI of the lumbar spine has been done result discussed with the patient        Review of Systems   Constitutional:  Negative for chills and fever.   HENT:  Negative for ear pain and sore throat.    Eyes:  Negative for pain and visual disturbance.   Respiratory:  Negative for cough and shortness of breath.    Cardiovascular:  Negative for chest pain and palpitations.   Gastrointestinal:  Negative for abdominal pain, constipation, diarrhea and vomiting.   Genitourinary:  Negative for dysuria and hematuria.   Musculoskeletal:  Negative for arthralgias and back pain.   Skin:  Negative for color change and rash.        Itchy skin   Neurological:  Negative for seizures and syncope.   All other systems reviewed and are negative.      Objective     /78 (BP Location: Left arm, Patient Position: Sitting, Cuff Size: Standard)   Pulse 61   Temp 97.6 °F (36.4 °C) (Tympanic)   Ht 5' 9.5\" (1.765 m)   Wt 93.9 kg (207 lb)   SpO2 97%   BMI 30.13 kg/m²     Physical Exam  Vitals and nursing note reviewed.   Constitutional:       General: He is not in acute distress.     Appearance: He is well-developed. He is not diaphoretic.   HENT:      Head: Normocephalic.      Right Ear: Tympanic membrane and external ear normal.      Left Ear: Tympanic membrane and external ear normal.      Nose: No rhinorrhea.      Mouth/Throat:      Pharynx: No posterior oropharyngeal erythema.   Eyes:      General:         Right eye: No discharge.         Left eye: No discharge.      Conjunctiva/sclera: Conjunctivae normal.   Neck:      Vascular: No JVD.   Cardiovascular:      Rate and Rhythm: Normal rate and regular rhythm.      Heart sounds: Normal heart sounds. No murmur heard.     No gallop.   Pulmonary:      Effort: Pulmonary effort is normal. No respiratory distress.      Breath sounds: Normal breath sounds. No stridor. No wheezing or rales.   Chest:      Chest wall: No tenderness.   Abdominal:      General: " There is no distension.      Palpations: Abdomen is soft. There is no mass.      Tenderness: There is no abdominal tenderness. There is no rebound.   Musculoskeletal:         General: No tenderness.      Cervical back: Normal range of motion and neck supple.   Lymphadenopathy:      Cervical: No cervical adenopathy.   Skin:     General: Skin is warm.      Findings: No erythema or rash.   Neurological:      Mental Status: He is alert and oriented to person, place, and time.       Administrative Statements

## 2024-06-28 NOTE — ASSESSMENT & PLAN NOTE
New diagnosis finding on MRI of the lumbar spine recommend to the patient to continue gabapentin 300 twice a day and physical therapy patient already follow-up with the pain management

## 2024-07-05 ENCOUNTER — EVALUATION (OUTPATIENT)
Dept: PHYSICAL THERAPY | Facility: MEDICAL CENTER | Age: 34
End: 2024-07-05
Payer: COMMERCIAL

## 2024-07-05 DIAGNOSIS — M54.16 LUMBAR RADICULITIS: Primary | ICD-10-CM

## 2024-07-05 PROCEDURE — 97161 PT EVAL LOW COMPLEX 20 MIN: CPT | Performed by: PHYSICAL THERAPIST

## 2024-07-05 NOTE — PROGRESS NOTES
PT Evaluation     Today's date: 2024  Patient name: Yasmine Wahl  : 1990  MRN: [id number]  Referring provider: Ross Morales MD  Dx:   Encounter Diagnosis     ICD-10-CM    1. Lumbar radiculitis  M54.16 Ambulatory referral to Physical Therapy                     Assessment  Impairments: abnormal muscle firing, abnormal muscle tone, abnormal or restricted ROM, impaired physical strength, lacks appropriate home exercise program and pain with function    Assessment details: Yasmine Wahl is a 33 y.o. male was evaluated on 2024  for Lumbar radiculitis  (primary encounter diagnosis). Yasmine Wahl has the above listed impairments resulting in functional deficits and negative impact to quality of life.  Patient is appropriate for skilled PT intervention to promote maximal return to function and patient specific goals.      Patient agrees with outlined treatment plan and all questions were answered to their satisfaction.      Understanding of Dx/Px/POC: good     Prognosis: good    Goals  Patient will successfully transition to home exercise program.  Patient will be able to manage symptoms independently.    Yasmine will be able to sit one hour without back and leg pain  Yasmine will report no limitation in driving for one hour     Plan  Patient would benefit from: skilled PT  Referral necessary: No  Planned modality interventions: thermotherapy: hydrocollator packs    Planned therapy interventions: home exercise program, manual therapy, neuromuscular re-education, patient education, functional ROM exercises, strengthening, stretching, joint mobilization, graded activity, graded exercise, therapeutic exercise, body mechanics training, motor coordination training and activity modification    Frequency: 1x week  Duration in weeks: 12  Treatment plan discussed with: patient        Subjective Evaluation    History of Present Illness  Mechanism of injury: Yasmine Wahl is a 33 y.o. male presenting to  therapy with complaints of low back pain of 4 month duration.  No injury noted, reports that most of his pain is on right side of lower back and extends to knee on posterior side of leg.  The pain is usually in his back, extends down leg with prolonged siting or lifting.   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   Denies numbness or tingling, red flag symptoms.    Patient Goals  Patient goals for therapy: decreased pain, increased motion, return to sport/leisure activities, independence with ADLs/IADLs and increased strength    Pain  Current pain rating: 3  At best pain ratin  At worst pain ratin  Quality: dull ache, discomfort and tight          Objective  Red Flag Screening:  History Cancer (-)  Bowl/Bladder dysfunction (-)  Night pain (-)  Unexplained weight loss (-)     Dermatomes:  WNL   Myotomes:  WNL    Reflexes:   Patellar R 2+, L 2+  Achilles R 2+, L 2+      Lumbar:  AROM:   Flexion WFL  Extension WFL with pain at end range centrally in back   Side bending WFL  Rotation WFL    Repeated Motion Testing: Improved pain in leg, central LBP     Active Motion Observations: Unremarkable       PAIVM: Comparable sign L5 on R     Special Tests:   Crossed SLR (-), SLR (-) Prone instability (-)   Neural Dynamic Testing: Tibial Bias (-), Peroneal Bias (-)   Slump Testing (-) with and without axial compression       Hip   WFL   Special Tests: CARMEN (-), FADIR (-)                         Precautions: None      Manuals                                                                 Neuro Re-Ed                                                                                                        Ther Ex             Prone press up or wall extenstions  10 every 1-2 hours             Sitting posture education  AF                                                                                           Ther Activity                                       Gait Training                                        Modalities

## 2024-07-18 ENCOUNTER — APPOINTMENT (OUTPATIENT)
Dept: LAB | Facility: MEDICAL CENTER | Age: 34
End: 2024-07-18
Payer: COMMERCIAL

## 2024-07-18 DIAGNOSIS — G89.29 ACUTE EXACERBATION OF CHRONIC LOW BACK PAIN: ICD-10-CM

## 2024-07-18 DIAGNOSIS — E55.9 VITAMIN D DEFICIENCY: ICD-10-CM

## 2024-07-18 DIAGNOSIS — Z13.29 SCREENING FOR THYROID DISORDER: ICD-10-CM

## 2024-07-18 DIAGNOSIS — L29.9 ITCHY SKIN: ICD-10-CM

## 2024-07-18 DIAGNOSIS — M54.50 ACUTE EXACERBATION OF CHRONIC LOW BACK PAIN: ICD-10-CM

## 2024-07-18 DIAGNOSIS — Z13.220 SCREENING FOR LIPID DISORDERS: ICD-10-CM

## 2024-07-18 LAB
25(OH)D3 SERPL-MCNC: 17.9 NG/ML (ref 30–100)
ALBUMIN SERPL BCG-MCNC: 4.3 G/DL (ref 3.5–5)
ALP SERPL-CCNC: 51 U/L (ref 34–104)
ALT SERPL W P-5'-P-CCNC: 15 U/L (ref 7–52)
ANA SER QL IA: NEGATIVE
ANION GAP SERPL CALCULATED.3IONS-SCNC: 8 MMOL/L (ref 4–13)
AST SERPL W P-5'-P-CCNC: 15 U/L (ref 13–39)
BASOPHILS # BLD AUTO: 0.03 THOUSANDS/ÂΜL (ref 0–0.1)
BASOPHILS NFR BLD AUTO: 1 % (ref 0–1)
BILIRUB SERPL-MCNC: 0.43 MG/DL (ref 0.2–1)
BUN SERPL-MCNC: 11 MG/DL (ref 5–25)
CALCIUM SERPL-MCNC: 9.4 MG/DL (ref 8.4–10.2)
CHLORIDE SERPL-SCNC: 104 MMOL/L (ref 96–108)
CHOLEST SERPL-MCNC: 215 MG/DL
CO2 SERPL-SCNC: 28 MMOL/L (ref 21–32)
CREAT SERPL-MCNC: 0.82 MG/DL (ref 0.6–1.3)
EOSINOPHIL # BLD AUTO: 0.24 THOUSAND/ÂΜL (ref 0–0.61)
EOSINOPHIL NFR BLD AUTO: 6 % (ref 0–6)
ERYTHROCYTE [DISTWIDTH] IN BLOOD BY AUTOMATED COUNT: 13 % (ref 11.6–15.1)
GFR SERPL CREATININE-BSD FRML MDRD: 117 ML/MIN/1.73SQ M
GLUCOSE P FAST SERPL-MCNC: 88 MG/DL (ref 65–99)
HCT VFR BLD AUTO: 47.2 % (ref 36.5–49.3)
HDLC SERPL-MCNC: 58 MG/DL
HGB BLD-MCNC: 15.5 G/DL (ref 12–17)
IMM GRANULOCYTES # BLD AUTO: 0.01 THOUSAND/UL (ref 0–0.2)
IMM GRANULOCYTES NFR BLD AUTO: 0 % (ref 0–2)
LDLC SERPL CALC-MCNC: 142 MG/DL (ref 0–100)
LYMPHOCYTES # BLD AUTO: 2.28 THOUSANDS/ÂΜL (ref 0.6–4.47)
LYMPHOCYTES NFR BLD AUTO: 59 % (ref 14–44)
MCH RBC QN AUTO: 29.2 PG (ref 26.8–34.3)
MCHC RBC AUTO-ENTMCNC: 32.8 G/DL (ref 31.4–37.4)
MCV RBC AUTO: 89 FL (ref 82–98)
MONOCYTES # BLD AUTO: 0.45 THOUSAND/ÂΜL (ref 0.17–1.22)
MONOCYTES NFR BLD AUTO: 12 % (ref 4–12)
NEUTROPHILS # BLD AUTO: 0.86 THOUSANDS/ÂΜL (ref 1.85–7.62)
NEUTS SEG NFR BLD AUTO: 22 % (ref 43–75)
NRBC BLD AUTO-RTO: 0 /100 WBCS
PLATELET # BLD AUTO: 224 THOUSANDS/UL (ref 149–390)
PMV BLD AUTO: 11.7 FL (ref 8.9–12.7)
POTASSIUM SERPL-SCNC: 3.9 MMOL/L (ref 3.5–5.3)
PROT SERPL-MCNC: 7.1 G/DL (ref 6.4–8.4)
RBC # BLD AUTO: 5.3 MILLION/UL (ref 3.88–5.62)
SODIUM SERPL-SCNC: 140 MMOL/L (ref 135–147)
TRIGL SERPL-MCNC: 73 MG/DL
TSH SERPL DL<=0.05 MIU/L-ACNC: 1.89 UIU/ML (ref 0.45–4.5)
WBC # BLD AUTO: 3.87 THOUSAND/UL (ref 4.31–10.16)

## 2024-07-18 PROCEDURE — 80061 LIPID PANEL: CPT

## 2024-07-18 PROCEDURE — 86008 ALLG SPEC IGE RECOMB EA: CPT

## 2024-07-18 PROCEDURE — 82785 ASSAY OF IGE: CPT

## 2024-07-18 PROCEDURE — 36415 COLL VENOUS BLD VENIPUNCTURE: CPT

## 2024-07-18 PROCEDURE — 82306 VITAMIN D 25 HYDROXY: CPT

## 2024-07-18 PROCEDURE — 86430 RHEUMATOID FACTOR TEST QUAL: CPT

## 2024-07-18 PROCEDURE — 80053 COMPREHEN METABOLIC PANEL: CPT

## 2024-07-18 PROCEDURE — 86200 CCP ANTIBODY: CPT

## 2024-07-18 PROCEDURE — 86038 ANTINUCLEAR ANTIBODIES: CPT

## 2024-07-18 PROCEDURE — 85025 COMPLETE CBC W/AUTO DIFF WBC: CPT

## 2024-07-18 PROCEDURE — 86003 ALLG SPEC IGE CRUDE XTRC EA: CPT

## 2024-07-18 PROCEDURE — 84443 ASSAY THYROID STIM HORMONE: CPT

## 2024-07-19 ENCOUNTER — OFFICE VISIT (OUTPATIENT)
Dept: PHYSICAL THERAPY | Facility: MEDICAL CENTER | Age: 34
End: 2024-07-19
Payer: COMMERCIAL

## 2024-07-19 DIAGNOSIS — M54.16 LUMBAR RADICULITIS: Primary | ICD-10-CM

## 2024-07-19 LAB
A ALTERNATA IGE QN: <0.1 KUA/I
A FUMIGATUS IGE QN: <0.1 KUA/I
ALMOND IGE QN: 0.47 KUA/I
ARA H6 PEANUT: <0.1 KUA/I
BERMUDA GRASS IGE QN: 23.1 KUA/I
BOXELDER IGE QN: 0.71 KUA/I
C HERBARUM IGE QN: <0.1 KUA/I
CASHEW NUT IGE QN: 0.11 KUA/I
CAT DANDER IGE QN: <0.1 KUA/I
CCP AB SER IA-ACNC: 1.7
CMN PIGWEED IGE QN: 0.56 KUA/I
CODFISH IGE QN: <0.1 KUA/I
COMMON RAGWEED IGE QN: 1.52 KUA/I
COTTONWOOD IGE QN: 0.67 KUA/I
D FARINAE IGE QN: 13.6 KUA/I
D PTERONYSS IGE QN: 18.3 KUA/I
DOG DANDER IGE QN: <0.1 KUA/I
EGG WHITE IGE QN: <0.1 KUA/I
GLUTEN IGE QN: 0.3 KUA/I
HAZELNUT IGE QN: 0.34 KUA/L
LONDON PLANE IGE QN: 0.6 KUA/I
MILK IGE QN: <0.1 KUA/I
MOUSE URINE PROT IGE QN: <0.1 KUA/I
MT JUNIPER IGE QN: 0.47 KUA/I
MUGWORT IGE QN: 0.46 KUA/I
P NOTATUM IGE QN: <0.1 KUA/I
PEANUT (RARA H) 1 IGE QN: <0.1 KUA/I
PEANUT (RARA H) 2 IGE QN: <0.1 KUA/I
PEANUT (RARA H) 3 IGE QN: <0.1 KUA/I
PEANUT (RARA H) 8 IGE QN: <0.1 KUA/I
PEANUT (RARA H) 9 IGE QN: <0.1 KUA/I
PEANUT IGE QN: 0.74 KUA/I
RHEUMATOID FACT SER QL LA: NEGATIVE
ROACH IGE QN: 0.35 KUA/I
SALMON IGE QN: <0.1 KUA/I
SCALLOP IGE QN: 0.32 KUA/L
SESAME SEED IGE QN: 0.61 KUA/I
SHEEP SORREL IGE QN: 0.77 KUA/I
SHRIMP IGE QN: <0.1 KUA/L
SILVER BIRCH IGE QN: 0.51 KUA/I
SOYBEAN IGE QN: 0.39 KUA/I
TIMOTHY IGE QN: 14.7 KUA/I
TOTAL IGE SMQN RAST: 135 KU/L (ref 0–113)
TOTAL IGE SMQN RAST: 135 KU/L (ref 0–113)
TUNA IGE QN: <0.1 KUA/I
WALNUT IGE QN: 0.43 KUA/I
WALNUT IGE QN: 0.64 KUA/I
WHEAT IGE QN: 0.6 KUA/I
WHITE ASH IGE QN: 0.61 KUA/I
WHITE ELM IGE QN: 1.3 KUA/I
WHITE MULBERRY IGE QN: 0.4 KUA/I
WHITE OAK IGE QN: 0.62 KUA/I

## 2024-07-19 PROCEDURE — 97110 THERAPEUTIC EXERCISES: CPT | Performed by: PHYSICAL THERAPIST

## 2024-07-19 PROCEDURE — 97140 MANUAL THERAPY 1/> REGIONS: CPT | Performed by: PHYSICAL THERAPIST

## 2024-07-19 NOTE — PROGRESS NOTES
Daily Note     Today's date: 2024  Patient name: Yasmine Wahl  : 1990  MRN: [id number]  Referring provider: Ross Morales MD  Dx:   Encounter Diagnosis     ICD-10-CM    1. Lumbar radiculitis  M54.16                      Subjective: Yasmine reports some minor improvement, burning pain in back when sitting in car for too long       Objective: See treatment diary below      Assessment: Tolerated treatment well. Patient exhibited good technique with therapeutic exercises and would benefit from continued PT      Plan: Continue per plan of care.      Precautions: None      Manuals             Prone UPA L5 R                                                    Neuro Re-Ed                                                                                                        Ther Ex             Prone press up or wall extenstions  10 every 1-2 hours             Sitting posture education  AF             Sciatic nerve glide 20                                                                             Ther Activity                                       Gait Training                                       Modalities             Prone traction 10 min 75#

## 2024-07-29 ENCOUNTER — TELEPHONE (OUTPATIENT)
Dept: FAMILY MEDICINE CLINIC | Facility: CLINIC | Age: 34
End: 2024-07-29

## 2024-07-31 ENCOUNTER — OFFICE VISIT (OUTPATIENT)
Dept: PHYSICAL THERAPY | Facility: MEDICAL CENTER | Age: 34
End: 2024-07-31
Payer: COMMERCIAL

## 2024-07-31 DIAGNOSIS — M54.16 LUMBAR RADICULITIS: Primary | ICD-10-CM

## 2024-07-31 PROCEDURE — 97140 MANUAL THERAPY 1/> REGIONS: CPT | Performed by: PHYSICAL THERAPIST

## 2024-07-31 PROCEDURE — 97110 THERAPEUTIC EXERCISES: CPT | Performed by: PHYSICAL THERAPIST

## 2024-07-31 NOTE — PROGRESS NOTES
Daily Note     Today's date: 2024  Patient name: Yasmine Wahl  : 1990  MRN: [id number]  Referring provider: Ross Morales MD  Dx:   Encounter Diagnosis     ICD-10-CM    1. Lumbar radiculitis  M54.16                      Subjective: Yasmine reports some minor improvement, still experiencing R glute pain with sitting for extended periods       Objective: See treatment diary below      Assessment: Tolerated treatment well. Patient exhibited good technique with therapeutic exercises and would benefit from continued PT      Plan: Continue per plan of care.      Precautions: None      Manuals             Prone UPA L5 R                                                    Neuro Re-Ed                                                                                                        Ther Ex             Prone press up or wall extenstions  10 every 1-2 hours             Sitting posture education  AF             Sciatic nerve glide 20            L sidelying lumbar rotation with nerve glide  20                                                                Ther Activity                                       Gait Training                                       Modalities             Prone traction 10 min 75#

## 2024-08-08 ENCOUNTER — TELEPHONE (OUTPATIENT)
Dept: FAMILY MEDICINE CLINIC | Facility: CLINIC | Age: 34
End: 2024-08-08

## 2024-08-08 NOTE — TELEPHONE ENCOUNTER
Patient  called and saw the results of his labs on mychart and are asking for vitamin d to be called into the pharmacy since it was low in labs -     [facility]

## 2024-08-09 DIAGNOSIS — E55.9 VITAMIN D DEFICIENCY: Primary | ICD-10-CM

## 2024-08-09 RX ORDER — ERGOCALCIFEROL 1.25 MG/1
50000 CAPSULE ORAL WEEKLY
Qty: 12 CAPSULE | Refills: 0 | Status: SHIPPED | OUTPATIENT
Start: 2024-08-09

## 2024-11-12 ENCOUNTER — OFFICE VISIT (OUTPATIENT)
Dept: FAMILY MEDICINE CLINIC | Facility: CLINIC | Age: 34
End: 2024-11-12
Payer: COMMERCIAL

## 2024-11-12 VITALS
HEART RATE: 59 BPM | TEMPERATURE: 96.6 F | DIASTOLIC BLOOD PRESSURE: 78 MMHG | BODY MASS INDEX: 28 KG/M2 | HEIGHT: 70 IN | SYSTOLIC BLOOD PRESSURE: 120 MMHG | OXYGEN SATURATION: 97 % | WEIGHT: 195.6 LBS

## 2024-11-12 DIAGNOSIS — E55.9 VITAMIN D DEFICIENCY: ICD-10-CM

## 2024-11-12 DIAGNOSIS — Z28.21 IMMUNIZATION REFUSED: ICD-10-CM

## 2024-11-12 DIAGNOSIS — Z00.01 ENCOUNTER FOR WELL ADULT EXAM WITH ABNORMAL FINDINGS: Primary | ICD-10-CM

## 2024-11-12 DIAGNOSIS — E66.3 OVERWEIGHT (BMI 25.0-29.9): ICD-10-CM

## 2024-11-12 DIAGNOSIS — D72.818 OTHER DECREASED WHITE BLOOD CELL (WBC) COUNT: ICD-10-CM

## 2024-11-12 DIAGNOSIS — Z13.29 SCREENING FOR THYROID DISORDER: ICD-10-CM

## 2024-11-12 DIAGNOSIS — Z91.018 FOOD ALLERGY: ICD-10-CM

## 2024-11-12 DIAGNOSIS — E78.2 MIXED HYPERLIPIDEMIA: ICD-10-CM

## 2024-11-12 PROCEDURE — 99395 PREV VISIT EST AGE 18-39: CPT | Performed by: FAMILY MEDICINE

## 2024-11-12 PROCEDURE — 99214 OFFICE O/P EST MOD 30 MIN: CPT | Performed by: FAMILY MEDICINE

## 2024-11-12 RX ORDER — ERGOCALCIFEROL 1.25 MG/1
50000 CAPSULE, LIQUID FILLED ORAL WEEKLY
Qty: 12 CAPSULE | Refills: 0 | Status: SHIPPED | OUTPATIENT
Start: 2024-11-12

## 2024-11-12 RX ORDER — EPINEPHRINE 0.3 MG/.3ML
0.3 INJECTION SUBCUTANEOUS ONCE
Qty: 0.6 ML | Refills: 0 | Status: SHIPPED | OUTPATIENT
Start: 2024-11-12 | End: 2024-11-12

## 2024-11-12 NOTE — ASSESSMENT & PLAN NOTE
Chronic uncontrolled recommend to take vitamin D 50,000 IU once a day vitamin D rich diet discussed the patient    Orders:    ergocalciferol (VITAMIN D2) 50,000 units; Take 1 capsule (50,000 Units total) by mouth once a week    CBC and differential; Future    Comprehensive metabolic panel; Future    Vitamin D 25 hydroxy; Future

## 2024-11-12 NOTE — ASSESSMENT & PLAN NOTE
Improving the BMI compared with before BMI down to 28.4 discussed portion control low-carb low-fat diet increase physical activity}    Orders:    CBC and differential; Future    Comprehensive metabolic panel; Future

## 2024-11-12 NOTE — ASSESSMENT & PLAN NOTE
Multiple food and respiratory allergy discussed the result of the blood work with the patient recommend to have EpiPen handy at home proper use review refer the patient to see allergist    Orders:    EPINEPHrine (EPIPEN) 0.3 mg/0.3 mL SOAJ; Inject 0.3 mL (0.3 mg total) into a muscle once for 1 dose    Ambulatory Referral to Allergy; Future    CBC and differential; Future    Comprehensive metabolic panel; Future    CBC and differential; Future    Ferritin; Future    Folate; Future    Iron; Future    TIBC Panel (incl. Iron, TIBC, % Iron Saturation); Future    Vitamin B12; Future    Protein electrophoresis, serum; Future    Protein electrophoresis, urine; Future

## 2024-11-12 NOTE — ASSESSMENT & PLAN NOTE
New diagnosis patient not candidate for statin recommend low-fat diet    Orders:    CBC and differential; Future    Comprehensive metabolic panel; Future    Lipid Panel with Direct LDL reflex; Future

## 2024-11-12 NOTE — ASSESSMENT & PLAN NOTE
Patient refused proper immunization recommended for him for today    Orders:    CBC and differential; Future    Comprehensive metabolic panel; Future

## 2024-11-12 NOTE — ASSESSMENT & PLAN NOTE
Advice and education were given regarding nutrition, aerobic exercises, weight-bearing exercises, cardiovascular risk reduction, fall risk reduction, and age-appropriate supplements.     The patient was counseled regarding instructions for management, risk factor reductions, prognosis, risks and benefits of treatment options, patient and family education, and importance of compliance with treatment.    Orders:    CBC and differential; Future    Comprehensive metabolic panel; Future

## 2024-11-12 NOTE — PROGRESS NOTES
Adult Annual Physical  Name: Yasmine Wahl      : 1990      MRN: [id number]  Encounter Provider: Romy Singh MD  Encounter Date: 2024   Encounter department: [facility]    Assessment & Plan  Encounter for well adult exam with abnormal findings  Advice and education were given regarding nutrition, aerobic exercises, weight-bearing exercises, cardiovascular risk reduction, fall risk reduction, and age-appropriate supplements.     The patient was counseled regarding instructions for management, risk factor reductions, prognosis, risks and benefits of treatment options, patient and family education, and importance of compliance with treatment.    Orders:  •  CBC and differential; Future  •  Comprehensive metabolic panel; Future    Overweight (BMI 25.0-29.9)  Improving the BMI compared with before BMI down to 28.4 discussed portion control low-carb low-fat diet increase physical activity}    Orders:  •  CBC and differential; Future  •  Comprehensive metabolic panel; Future    Immunization refused  Patient refused proper immunization recommended for him for today    Orders:  •  CBC and differential; Future  •  Comprehensive metabolic panel; Future    Vitamin D deficiency  Chronic uncontrolled recommend to take vitamin D 50,000 IU once a day vitamin D rich diet discussed the patient    Orders:  •  ergocalciferol (VITAMIN D2) 50,000 units; Take 1 capsule (50,000 Units total) by mouth once a week  •  CBC and differential; Future  •  Comprehensive metabolic panel; Future  •  Vitamin D 25 hydroxy; Future    Mixed hyperlipidemia  New diagnosis patient not candidate for statin recommend low-fat diet    Orders:  •  CBC and differential; Future  •  Comprehensive metabolic panel; Future  •  Lipid Panel with Direct LDL reflex; Future    Other decreased white blood cell (WBC) count  Asymptomatic decrease in the white blood cell no fever no weight loss recommend workup discussed  the patient agree    Orders:  •  CBC and differential; Future  •  Comprehensive metabolic panel; Future  •  CBC and differential; Future  •  Ferritin; Future  •  Folate; Future  •  Iron; Future  •  TIBC Panel (incl. Iron, TIBC, % Iron Saturation); Future  •  Vitamin B12; Future  •  Protein electrophoresis, serum; Future  •  Protein electrophoresis, urine; Future    Food allergy  Multiple food and respiratory allergy discussed the result of the blood work with the patient recommend to have EpiPen handy at home proper use review refer the patient to see allergist    Orders:  •  EPINEPHrine (EPIPEN) 0.3 mg/0.3 mL SOAJ; Inject 0.3 mL (0.3 mg total) into a muscle once for 1 dose  •  Ambulatory Referral to Allergy; Future  •  CBC and differential; Future  •  Comprehensive metabolic panel; Future  •  CBC and differential; Future  •  Ferritin; Future  •  Folate; Future  •  Iron; Future  •  TIBC Panel (incl. Iron, TIBC, % Iron Saturation); Future  •  Vitamin B12; Future  •  Protein electrophoresis, serum; Future  •  Protein electrophoresis, urine; Future    Screening for thyroid disorder    Orders:  •  TSH, 3rd generation with Free T4 reflex; Future    Immunizations and preventive care screenings were discussed with patient today. Appropriate education was printed on patient's after visit summary.    Counseling:  Alcohol/drug use: discussed moderation in alcohol intake, the recommendations for healthy alcohol use, and avoidance of illicit drug use.  Dental Health: discussed importance of regular tooth brushing, flossing, and dental visits.  Injury prevention: discussed safety/seat belts, safety helmets, smoke detectors, carbon monoxide detectors, and smoking near bedding or upholstery.  Sexual health: discussed sexually transmitted diseases, partner selection, use of condoms, avoidance of unintended pregnancy, and contraceptive alternatives.  Exercise: the importance of regular exercise/physical activity was discussed.  "Recommend exercise 3-5 times per week for at least 30 minutes.       Depression Screening and Follow-up Plan: Patient was screened for depression during today's encounter. They screened negative with a PHQ-2 score of 0.      History of Present Illness   Patient here for well exam and follow-up with a chronic condition no new concern recent blood work discussed with patient past medical surgical family and social history review    Adult Annual Physical:  Patient presents for annual physical.     Diet and Physical Activity:  - Diet/Nutrition: intermittent fasting.  - Exercise: walking.    Depression Screening:  - PHQ-2 Score: 0    General Health:  - Sleep: sleeps well.  - Hearing: normal hearing bilateral ears.  - Vision: no vision problems.  - Dental: regular dental visits.     Health:  - History of STDs: no.   - Urinary symptoms: none.     Review of Systems   Constitutional:  Negative for chills and fever.   HENT:  Negative for ear pain and sore throat.    Eyes:  Negative for pain and visual disturbance.   Respiratory:  Negative for cough and shortness of breath.    Cardiovascular:  Negative for chest pain and palpitations.   Gastrointestinal:  Negative for abdominal pain, constipation, diarrhea and vomiting.   Genitourinary:  Negative for dysuria and hematuria.   Musculoskeletal:  Negative for arthralgias and back pain.   Skin:  Negative for color change and rash.   Neurological:  Negative for seizures and syncope.   All other systems reviewed and are negative.        Objective     /78 (BP Location: Left arm, Patient Position: Sitting)   Pulse 59   Temp (!) 96.6 °F (35.9 °C) (Tympanic)   Ht 5' 9.5\" (1.765 m)   Wt 88.7 kg (195 lb 9.6 oz)   SpO2 97%   BMI 28.47 kg/m²     Physical Exam  Vitals and nursing note reviewed.   Constitutional:       General: He is not in acute distress.     Appearance: He is well-developed. He is not diaphoretic.   HENT:      Head: Normocephalic.      Right Ear: Tympanic " membrane and external ear normal.      Left Ear: Tympanic membrane and external ear normal.      Nose: No rhinorrhea.      Mouth/Throat:      Pharynx: No posterior oropharyngeal erythema.   Eyes:      General:         Right eye: No discharge.         Left eye: No discharge.      Conjunctiva/sclera: Conjunctivae normal.   Neck:      Vascular: No JVD.   Cardiovascular:      Rate and Rhythm: Normal rate and regular rhythm.      Heart sounds: Normal heart sounds. No murmur heard.     No gallop.   Pulmonary:      Effort: Pulmonary effort is normal. No respiratory distress.      Breath sounds: Normal breath sounds. No wheezing.   Abdominal:      General: There is no distension.      Palpations: Abdomen is soft.      Tenderness: There is no abdominal tenderness. There is no rebound.   Musculoskeletal:         General: No tenderness.      Cervical back: Normal range of motion and neck supple.   Lymphadenopathy:      Cervical: No cervical adenopathy.   Skin:     General: Skin is warm.      Findings: No rash.   Neurological:      Mental Status: He is alert and oriented to person, place, and time.

## 2024-11-12 NOTE — ASSESSMENT & PLAN NOTE
Asymptomatic decrease in the white blood cell no fever no weight loss recommend workup discussed the patient agree    Orders:    CBC and differential; Future    Comprehensive metabolic panel; Future    CBC and differential; Future    Ferritin; Future    Folate; Future    Iron; Future    TIBC Panel (incl. Iron, TIBC, % Iron Saturation); Future    Vitamin B12; Future    Protein electrophoresis, serum; Future    Protein electrophoresis, urine; Future

## 2025-03-06 DIAGNOSIS — Z00.6 ENCOUNTER FOR EXAMINATION FOR NORMAL COMPARISON OR CONTROL IN CLINICAL RESEARCH PROGRAM: ICD-10-CM

## 2025-04-07 ENCOUNTER — OFFICE VISIT (OUTPATIENT)
Dept: FAMILY MEDICINE CLINIC | Facility: CLINIC | Age: 35
End: 2025-04-07
Payer: COMMERCIAL

## 2025-04-07 VITALS
HEART RATE: 75 BPM | HEIGHT: 69 IN | SYSTOLIC BLOOD PRESSURE: 110 MMHG | OXYGEN SATURATION: 98 % | TEMPERATURE: 97.7 F | WEIGHT: 200 LBS | DIASTOLIC BLOOD PRESSURE: 60 MMHG | BODY MASS INDEX: 29.62 KG/M2

## 2025-04-07 DIAGNOSIS — R42 VERTIGO: ICD-10-CM

## 2025-04-07 DIAGNOSIS — R73.01 IFG (IMPAIRED FASTING GLUCOSE): Primary | ICD-10-CM

## 2025-04-07 DIAGNOSIS — H93.12 TINNITUS, LEFT EAR: ICD-10-CM

## 2025-04-07 DIAGNOSIS — J10.1 INFLUENZA B: ICD-10-CM

## 2025-04-07 PROCEDURE — 99214 OFFICE O/P EST MOD 30 MIN: CPT | Performed by: FAMILY MEDICINE

## 2025-04-07 RX ORDER — MECLIZINE HCL 12.5 MG 12.5 MG/1
12.5 TABLET ORAL 3 TIMES DAILY PRN
Qty: 30 TABLET | Refills: 0 | Status: SHIPPED | OUTPATIENT
Start: 2025-04-07

## 2025-04-07 RX ORDER — ONDANSETRON 4 MG/1
TABLET, ORALLY DISINTEGRATING ORAL
COMMUNITY
Start: 2025-03-27

## 2025-04-08 PROBLEM — J10.1 INFLUENZA B: Status: ACTIVE | Noted: 2025-04-08

## 2025-04-08 PROBLEM — R42 VERTIGO: Status: ACTIVE | Noted: 2025-04-08

## 2025-04-08 PROBLEM — H93.12 TINNITUS, LEFT EAR: Status: ACTIVE | Noted: 2025-04-08

## 2025-04-08 NOTE — PROGRESS NOTES
Name: Yasmine Wahl      : 1990      MRN: [id number]  Encounter Provider: Romy Singh MD  Encounter Date: 2025   Encounter department: [facility]  :  Assessment & Plan  IFG (impaired fasting glucose)  New diagnosis finding recent blood work done that during her ER visit reviewed with the patient recommend low-carb diet important lose weight       Vertigo  New diagnosis symptomatic status post upper respiratory infection discussed with the patient not short of the problem recommend meclizine proper use and possible side effect review recommend low caffeine low-salt diet well hydration fall precaution discussed  Orders:  •  meclizine (ANTIVERT) 12.5 MG tablet; Take 1 tablet (12.5 mg total) by mouth 3 (three) times a day as needed for dizziness    Tinnitus, left ear  New diagnosis symptomatic associated with decreased hearing recommend patient to be evaluated by ENT patient he will call to make appointment        Influenza B  Status post ER visit improving the symptom              History of Present Illness   Patient here concerned about dizziness it has been going on since the last week of March patient went to emergency room he had upper respiratory infection symptom tested positive for influenza B since then he still have dizzy like headache sometimes turning his head to either direction aggravated associated with nausea and headache and ringing in the ear especially on the left side and patient noticed and it has been going on even before this episode decreased hearing in the left ear no drainage no bleeding ER record reviewed with the patient      Review of Systems   Constitutional:  Negative for chills and fever.   HENT:  Positive for ear pain. Negative for sore throat.    Eyes:  Negative for pain and visual disturbance.   Respiratory:  Negative for cough and shortness of breath.    Cardiovascular:  Negative for chest pain and palpitations.   Gastrointestinal:  Negative  "for abdominal pain, constipation, diarrhea and vomiting.   Genitourinary:  Negative for dysuria and hematuria.   Musculoskeletal:  Negative for arthralgias and back pain.   Skin:  Negative for color change and rash.   Neurological:  Positive for dizziness. Negative for seizures and syncope.   All other systems reviewed and are negative.      Objective   /60 (BP Location: Left arm, Patient Position: Sitting)   Pulse 75   Temp 97.7 °F (36.5 °C) (Tympanic)   Ht 5' 9\" (1.753 m)   Wt 90.7 kg (200 lb)   SpO2 98%   BMI 29.53 kg/m²      Physical Exam  Vitals and nursing note reviewed.   Constitutional:       General: He is not in acute distress.     Appearance: He is well-developed. He is not diaphoretic.   HENT:      Head: Normocephalic.      Right Ear: Tympanic membrane and external ear normal.      Left Ear: Tympanic membrane and external ear normal.      Nose: No rhinorrhea.      Mouth/Throat:      Pharynx: No posterior oropharyngeal erythema.   Eyes:      General:         Right eye: No discharge.         Left eye: No discharge.      Conjunctiva/sclera: Conjunctivae normal.   Neck:      Vascular: No JVD.   Cardiovascular:      Rate and Rhythm: Normal rate and regular rhythm.      Heart sounds: Normal heart sounds. No murmur heard.     No gallop.   Pulmonary:      Effort: Pulmonary effort is normal. No respiratory distress.      Breath sounds: Normal breath sounds. No wheezing.   Abdominal:      General: There is no distension.      Palpations: Abdomen is soft.      Tenderness: There is no abdominal tenderness. There is no rebound.   Musculoskeletal:         General: No tenderness.      Cervical back: Normal range of motion and neck supple.   Lymphadenopathy:      Cervical: No cervical adenopathy.   Skin:     General: Skin is warm.      Findings: No rash.   Neurological:      Mental Status: He is alert and oriented to person, place, and time.         "

## 2025-04-08 NOTE — ASSESSMENT & PLAN NOTE
New diagnosis symptomatic status post upper respiratory infection discussed with the patient not short of the problem recommend meclizine proper use and possible side effect review recommend low caffeine low-salt diet well hydration fall precaution discussed  Orders:  •  meclizine (ANTIVERT) 12.5 MG tablet; Take 1 tablet (12.5 mg total) by mouth 3 (three) times a day as needed for dizziness

## 2025-04-08 NOTE — ASSESSMENT & PLAN NOTE
New diagnosis finding recent blood work done that during her ER visit reviewed with the patient recommend low-carb diet important lose weight

## 2025-04-08 NOTE — ASSESSMENT & PLAN NOTE
New diagnosis symptomatic associated with decreased hearing recommend patient to be evaluated by ENT patient he will call to make appointment

## 2025-05-08 PROBLEM — J10.1 INFLUENZA B: Status: RESOLVED | Noted: 2025-04-08 | Resolved: 2025-05-08

## 2025-05-09 ENCOUNTER — APPOINTMENT (OUTPATIENT)
Dept: LAB | Facility: MEDICAL CENTER | Age: 35
End: 2025-05-09
Payer: COMMERCIAL

## 2025-05-09 DIAGNOSIS — E66.3 OVERWEIGHT (BMI 25.0-29.9): ICD-10-CM

## 2025-05-09 DIAGNOSIS — E55.9 VITAMIN D DEFICIENCY: ICD-10-CM

## 2025-05-09 DIAGNOSIS — Z13.29 SCREENING FOR THYROID DISORDER: ICD-10-CM

## 2025-05-09 DIAGNOSIS — D72.818 OTHER DECREASED WHITE BLOOD CELL (WBC) COUNT: ICD-10-CM

## 2025-05-09 DIAGNOSIS — Z91.018 FOOD ALLERGY: ICD-10-CM

## 2025-05-09 DIAGNOSIS — Z28.21 IMMUNIZATION REFUSED: ICD-10-CM

## 2025-05-09 DIAGNOSIS — E78.2 MIXED HYPERLIPIDEMIA: ICD-10-CM

## 2025-05-09 DIAGNOSIS — Z00.6 ENCOUNTER FOR EXAMINATION FOR NORMAL COMPARISON OR CONTROL IN CLINICAL RESEARCH PROGRAM: ICD-10-CM

## 2025-05-09 DIAGNOSIS — Z00.01 ENCOUNTER FOR WELL ADULT EXAM WITH ABNORMAL FINDINGS: ICD-10-CM

## 2025-05-09 LAB
25(OH)D3 SERPL-MCNC: 17.6 NG/ML (ref 30–100)
ALBUMIN SERPL BCG-MCNC: 4.3 G/DL (ref 3.5–5)
ALP SERPL-CCNC: 63 U/L (ref 34–104)
ALT SERPL W P-5'-P-CCNC: 38 U/L (ref 7–52)
ANION GAP SERPL CALCULATED.3IONS-SCNC: 8 MMOL/L (ref 4–13)
AST SERPL W P-5'-P-CCNC: 19 U/L (ref 13–39)
BASOPHILS # BLD AUTO: 0.04 THOUSANDS/ÂΜL (ref 0–0.1)
BASOPHILS NFR BLD AUTO: 1 % (ref 0–1)
BILIRUB SERPL-MCNC: 0.31 MG/DL (ref 0.2–1)
BUN SERPL-MCNC: 18 MG/DL (ref 5–25)
CALCIUM SERPL-MCNC: 9.2 MG/DL (ref 8.4–10.2)
CHLORIDE SERPL-SCNC: 105 MMOL/L (ref 96–108)
CHOLEST SERPL-MCNC: 198 MG/DL (ref ?–200)
CO2 SERPL-SCNC: 28 MMOL/L (ref 21–32)
CREAT SERPL-MCNC: 0.9 MG/DL (ref 0.6–1.3)
EOSINOPHIL # BLD AUTO: 0.21 THOUSAND/ÂΜL (ref 0–0.61)
EOSINOPHIL NFR BLD AUTO: 4 % (ref 0–6)
ERYTHROCYTE [DISTWIDTH] IN BLOOD BY AUTOMATED COUNT: 13.8 % (ref 11.6–15.1)
FERRITIN SERPL-MCNC: 68 NG/ML (ref 30–336)
FOLATE SERPL-MCNC: 8 NG/ML
GFR SERPL CREATININE-BSD FRML MDRD: 112 ML/MIN/1.73SQ M
GLUCOSE P FAST SERPL-MCNC: 94 MG/DL (ref 65–99)
HCT VFR BLD AUTO: 43.8 % (ref 36.5–49.3)
HDLC SERPL-MCNC: 57 MG/DL
HGB BLD-MCNC: 15 G/DL (ref 12–17)
IMM GRANULOCYTES # BLD AUTO: 0.01 THOUSAND/UL (ref 0–0.2)
IMM GRANULOCYTES NFR BLD AUTO: 0 % (ref 0–2)
IRON SATN MFR SERPL: 13 % (ref 15–50)
IRON SERPL-MCNC: 42 UG/DL (ref 50–212)
LDLC SERPL CALC-MCNC: 123 MG/DL (ref 0–100)
LYMPHOCYTES # BLD AUTO: 2.86 THOUSANDS/ÂΜL (ref 0.6–4.47)
LYMPHOCYTES NFR BLD AUTO: 59 % (ref 14–44)
MCH RBC QN AUTO: 30.1 PG (ref 26.8–34.3)
MCHC RBC AUTO-ENTMCNC: 34.2 G/DL (ref 31.4–37.4)
MCV RBC AUTO: 88 FL (ref 82–98)
MONOCYTES # BLD AUTO: 0.51 THOUSAND/ÂΜL (ref 0.17–1.22)
MONOCYTES NFR BLD AUTO: 11 % (ref 4–12)
NEUTROPHILS # BLD AUTO: 1.19 THOUSANDS/ÂΜL (ref 1.85–7.62)
NEUTS SEG NFR BLD AUTO: 25 % (ref 43–75)
NRBC BLD AUTO-RTO: 0 /100 WBCS
PLATELET # BLD AUTO: 185 THOUSANDS/UL (ref 149–390)
PMV BLD AUTO: 11.9 FL (ref 8.9–12.7)
POTASSIUM SERPL-SCNC: 3.9 MMOL/L (ref 3.5–5.3)
PROT SERPL-MCNC: 6.9 G/DL (ref 6.4–8.4)
RBC # BLD AUTO: 4.98 MILLION/UL (ref 3.88–5.62)
SODIUM SERPL-SCNC: 141 MMOL/L (ref 135–147)
TIBC SERPL-MCNC: 315 UG/DL (ref 250–450)
TRANSFERRIN SERPL-MCNC: 225 MG/DL (ref 203–362)
TRIGL SERPL-MCNC: 89 MG/DL (ref ?–150)
TSH SERPL DL<=0.05 MIU/L-ACNC: 3.18 UIU/ML (ref 0.45–4.5)
UIBC SERPL-MCNC: 273 UG/DL (ref 155–355)
VIT B12 SERPL-MCNC: 385 PG/ML (ref 180–914)
WBC # BLD AUTO: 4.82 THOUSAND/UL (ref 4.31–10.16)

## 2025-05-09 PROCEDURE — 84165 PROTEIN E-PHORESIS SERUM: CPT

## 2025-05-09 PROCEDURE — 82746 ASSAY OF FOLIC ACID SERUM: CPT

## 2025-05-09 PROCEDURE — 80053 COMPREHEN METABOLIC PANEL: CPT

## 2025-05-09 PROCEDURE — 83540 ASSAY OF IRON: CPT

## 2025-05-09 PROCEDURE — 82607 VITAMIN B-12: CPT

## 2025-05-09 PROCEDURE — 36415 COLL VENOUS BLD VENIPUNCTURE: CPT

## 2025-05-09 PROCEDURE — 82306 VITAMIN D 25 HYDROXY: CPT

## 2025-05-09 PROCEDURE — 83550 IRON BINDING TEST: CPT

## 2025-05-09 PROCEDURE — 84443 ASSAY THYROID STIM HORMONE: CPT

## 2025-05-09 PROCEDURE — 82728 ASSAY OF FERRITIN: CPT

## 2025-05-09 PROCEDURE — 80061 LIPID PANEL: CPT

## 2025-05-09 PROCEDURE — 85025 COMPLETE CBC W/AUTO DIFF WBC: CPT

## 2025-05-09 PROCEDURE — 84166 PROTEIN E-PHORESIS/URINE/CSF: CPT

## 2025-05-12 LAB
ALBUMIN SERPL ELPH-MCNC: 4.27 G/DL (ref 3.2–5.1)
ALBUMIN SERPL ELPH-MCNC: 62.8 % (ref 48–70)
ALBUMIN UR ELPH-MCNC: 100 %
ALPHA1 GLOB MFR UR ELPH: 0 %
ALPHA1 GLOB SERPL ELPH-MCNC: 0.22 G/DL (ref 0.15–0.47)
ALPHA1 GLOB SERPL ELPH-MCNC: 3.3 % (ref 1.8–7)
ALPHA2 GLOB MFR UR ELPH: 0 %
ALPHA2 GLOB SERPL ELPH-MCNC: 0.49 G/DL (ref 0.42–1.04)
ALPHA2 GLOB SERPL ELPH-MCNC: 7.2 % (ref 5.9–14.9)
B-GLOBULIN MFR UR ELPH: 0 %
BETA GLOB ABNORMAL SERPL ELPH-MCNC: 0.37 G/DL (ref 0.31–0.57)
BETA1 GLOB SERPL ELPH-MCNC: 5.5 % (ref 4.7–7.7)
BETA2 GLOB SERPL ELPH-MCNC: 4.8 % (ref 3.1–7.9)
BETA2+GAMMA GLOB SERPL ELPH-MCNC: 0.33 G/DL (ref 0.2–0.58)
GAMMA GLOB ABNORMAL SERPL ELPH-MCNC: 1.12 G/DL (ref 0.4–1.66)
GAMMA GLOB MFR UR ELPH: 0 %
GAMMA GLOB SERPL ELPH-MCNC: 16.4 % (ref 6.9–22.3)
IGG/ALB SER: 1.69 {RATIO} (ref 1.1–1.8)
PROT SERPL-MCNC: 6.8 G/DL (ref 6.4–8.4)
PROT UR-MCNC: 10.9 MG/DL

## 2025-05-12 PROCEDURE — 84166 PROTEIN E-PHORESIS/URINE/CSF: CPT | Performed by: PATHOLOGY

## 2025-05-12 PROCEDURE — 84165 PROTEIN E-PHORESIS SERUM: CPT | Performed by: PATHOLOGY

## 2025-05-13 ENCOUNTER — OFFICE VISIT (OUTPATIENT)
Dept: FAMILY MEDICINE CLINIC | Facility: CLINIC | Age: 35
End: 2025-05-13
Payer: COMMERCIAL

## 2025-05-13 VITALS
WEIGHT: 205 LBS | HEART RATE: 70 BPM | TEMPERATURE: 96.9 F | OXYGEN SATURATION: 98 % | DIASTOLIC BLOOD PRESSURE: 70 MMHG | HEIGHT: 70 IN | SYSTOLIC BLOOD PRESSURE: 110 MMHG | BODY MASS INDEX: 29.35 KG/M2

## 2025-05-13 DIAGNOSIS — J30.1 SEASONAL ALLERGIC RHINITIS DUE TO POLLEN: Primary | ICD-10-CM

## 2025-05-13 DIAGNOSIS — E61.1 IRON DEFICIENCY: ICD-10-CM

## 2025-05-13 DIAGNOSIS — E53.8 B12 DEFICIENCY: ICD-10-CM

## 2025-05-13 DIAGNOSIS — E55.9 VITAMIN D DEFICIENCY: ICD-10-CM

## 2025-05-13 DIAGNOSIS — E78.2 MIXED HYPERLIPIDEMIA: ICD-10-CM

## 2025-05-13 PROCEDURE — 99214 OFFICE O/P EST MOD 30 MIN: CPT | Performed by: FAMILY MEDICINE

## 2025-05-13 RX ORDER — CYANOCOBALAMIN (VITAMIN B-12) 500 MCG
500 TABLET ORAL DAILY
Qty: 90 TABLET | Refills: 1 | Status: SHIPPED | OUTPATIENT
Start: 2025-05-13

## 2025-05-13 RX ORDER — ERGOCALCIFEROL 1.25 MG/1
50000 CAPSULE, LIQUID FILLED ORAL WEEKLY
Qty: 12 CAPSULE | Refills: 0 | Status: SHIPPED | OUTPATIENT
Start: 2025-05-13

## 2025-05-13 RX ORDER — FERROUS SULFATE 325(65) MG
325 TABLET ORAL
Qty: 90 TABLET | Refills: 1 | Status: SHIPPED | OUTPATIENT
Start: 2025-05-13

## 2025-05-13 RX ORDER — LORATADINE 10 MG/1
10 TABLET ORAL DAILY
Qty: 30 TABLET | Refills: 2 | Status: SHIPPED | OUTPATIENT
Start: 2025-05-13

## 2025-05-13 NOTE — ASSESSMENT & PLAN NOTE
New diagnosis finding on recent blood work vitamin B12 in the low range of the normal recommend start vitamin B12 supplement proper use review  Orders:  •  vitamin B-12 (CYANOCOBALAMIN) 500 MCG TABS; Take 1 tablet (500 mcg total) by mouth daily  •  CBC and differential; Future  •  Ferritin; Future  •  Iron; Future  •  Vitamin B12; Future

## 2025-05-13 NOTE — ASSESSMENT & PLAN NOTE
Chronic uncontrolled recommend vitamin D 50,000 IU once a week for 12 weeks proper use review  Orders:  •  ergocalciferol (VITAMIN D2) 50,000 units; Take 1 capsule (50,000 Units total) by mouth once a week  •  CBC and differential; Future  •  Ferritin; Future  •  Iron; Future  •  Vitamin B12; Future

## 2025-05-13 NOTE — ASSESSMENT & PLAN NOTE
New diagnosis symptomatic recommend start antihistamine loratadine 10 mg once a day proper use review  Orders:  •  loratadine (CLARITIN) 10 mg tablet; Take 1 tablet (10 mg total) by mouth daily

## 2025-05-13 NOTE — PROGRESS NOTES
Name: Yasmine Wahl      : 1990      MRN: [id number]  Encounter Provider: Romy Singh MD  Encounter Date: 2025   Encounter department: [facility] PRIMARY CARE  :  Assessment & Plan  Seasonal allergic rhinitis due to pollen  New diagnosis symptomatic recommend start antihistamine loratadine 10 mg once a day proper use review  Orders:  •  loratadine (CLARITIN) 10 mg tablet; Take 1 tablet (10 mg total) by mouth daily    Vitamin D deficiency  Chronic uncontrolled recommend vitamin D 50,000 IU once a week for 12 weeks proper use review  Orders:  •  ergocalciferol (VITAMIN D2) 50,000 units; Take 1 capsule (50,000 Units total) by mouth once a week  •  CBC and differential; Future  •  Ferritin; Future  •  Iron; Future  •  Vitamin B12; Future    B12 deficiency  New diagnosis finding on recent blood work vitamin B12 in the low range of the normal recommend start vitamin B12 supplement proper use review  Orders:  •  vitamin B-12 (CYANOCOBALAMIN) 500 MCG TABS; Take 1 tablet (500 mcg total) by mouth daily  •  CBC and differential; Future  •  Ferritin; Future  •  Iron; Future  •  Vitamin B12; Future    Iron deficiency  New diagnosis finding on recent blood work recommend to start ferrous sulfate 325 mg once a day plan to recheck a level to confirm improvement and will follow-up with the result accordingly  Orders:  •  ferrous sulfate 325 (65 Fe) mg tablet; Take 1 tablet (325 mg total) by mouth daily with breakfast  •  CBC and differential; Future  •  Ferritin; Future  •  Iron; Future  •  Vitamin B12; Future    Mixed hyperlipidemia  Chronic improved compared with before encourage patient to continue with a low-fat diet         Assessment & Plan           History of Present Illness   Patient here follow-up with a chronic condition compliant with the medication tolerated well without side effect and he is concerned about allergy symptoms including itchy nose itchy eyes congestion postnasal drip no  "wheezing no short of breath and no dyspnea on exertion recent blood work reviewed with the patient      Review of Systems   Constitutional:  Negative for chills and fever.   HENT:  Positive for congestion, postnasal drip and rhinorrhea. Negative for ear pain and sore throat.    Eyes:  Negative for pain and visual disturbance.   Respiratory:  Negative for cough and shortness of breath.    Cardiovascular:  Negative for chest pain and palpitations.   Gastrointestinal:  Negative for abdominal pain and vomiting.   Genitourinary:  Negative for dysuria and hematuria.   Musculoskeletal:  Negative for arthralgias and back pain.   Skin:  Negative for color change and rash.   Neurological:  Negative for seizures and syncope.   All other systems reviewed and are negative.      Objective   /70 (BP Location: Left arm, Patient Position: Sitting)   Pulse 70   Temp (!) 96.9 °F (36.1 °C) (Tympanic)   Ht 5' 10\" (1.778 m)   Wt 93 kg (205 lb)   SpO2 98%   BMI 29.41 kg/m²      Physical Exam  Vitals and nursing note reviewed.   Constitutional:       General: He is not in acute distress.     Appearance: He is well-developed. He is not diaphoretic.   HENT:      Head: Normocephalic.      Right Ear: Tympanic membrane and external ear normal.      Left Ear: Tympanic membrane and external ear normal.      Nose: No rhinorrhea.      Mouth/Throat:      Pharynx: No posterior oropharyngeal erythema.   Eyes:      General:         Right eye: No discharge.         Left eye: No discharge.      Conjunctiva/sclera: Conjunctivae normal.   Neck:      Vascular: No JVD.   Cardiovascular:      Rate and Rhythm: Normal rate and regular rhythm.      Heart sounds: Normal heart sounds. No murmur heard.     No gallop.   Pulmonary:      Effort: Pulmonary effort is normal. No respiratory distress.      Breath sounds: Normal breath sounds. No wheezing.   Abdominal:      General: There is no distension.      Palpations: Abdomen is soft.      Tenderness: " There is no abdominal tenderness. There is no rebound.   Musculoskeletal:         General: No tenderness.      Cervical back: Normal range of motion and neck supple.   Lymphadenopathy:      Cervical: No cervical adenopathy.   Skin:     General: Skin is warm.      Findings: No rash.   Neurological:      Mental Status: He is alert and oriented to person, place, and time.

## 2025-05-13 NOTE — ASSESSMENT & PLAN NOTE
New diagnosis finding on recent blood work recommend to start ferrous sulfate 325 mg once a day plan to recheck a level to confirm improvement and will follow-up with the result accordingly  Orders:  •  ferrous sulfate 325 (65 Fe) mg tablet; Take 1 tablet (325 mg total) by mouth daily with breakfast  •  CBC and differential; Future  •  Ferritin; Future  •  Iron; Future  •  Vitamin B12; Future

## 2025-05-19 LAB
APOB+LDLR+PCSK9 GENE MUT ANL BLD/T: NOT DETECTED
BRCA1+BRCA2 DEL+DUP + FULL MUT ANL BLD/T: NOT DETECTED
MLH1+MSH2+MSH6+PMS2 GN DEL+DUP+FUL M: NOT DETECTED